# Patient Record
Sex: FEMALE | Race: WHITE | Employment: OTHER | URBAN - NONMETROPOLITAN AREA
[De-identification: names, ages, dates, MRNs, and addresses within clinical notes are randomized per-mention and may not be internally consistent; named-entity substitution may affect disease eponyms.]

---

## 2017-04-05 ENCOUNTER — HOSPITAL ENCOUNTER (OUTPATIENT)
Dept: GENERAL RADIOLOGY | Age: 77
Discharge: HOME | End: 2017-04-05
Payer: MEDICARE

## 2017-04-05 ENCOUNTER — HOSPITAL ENCOUNTER (OUTPATIENT)
Dept: CT IMAGING | Age: 77
Discharge: HOME | End: 2017-04-05
Payer: MEDICARE

## 2017-04-05 ENCOUNTER — HOSPITAL ENCOUNTER (OUTPATIENT)
Dept: GENERAL RADIOLOGY | Age: 77
End: 2017-04-05
Payer: MEDICARE

## 2017-04-05 VITALS
BODY MASS INDEX: 31.34 KG/M2 | WEIGHT: 166 LBS | DIASTOLIC BLOOD PRESSURE: 61 MMHG | SYSTOLIC BLOOD PRESSURE: 127 MMHG | OXYGEN SATURATION: 92 % | TEMPERATURE: 98.3 F | RESPIRATION RATE: 17 BRPM | HEIGHT: 61 IN | HEART RATE: 60 BPM

## 2017-04-05 DIAGNOSIS — M79.604 RIGHT LEG PAIN: ICD-10-CM

## 2017-04-05 DIAGNOSIS — Z98.890 STATUS POST MYELOGRAM: ICD-10-CM

## 2017-04-05 DIAGNOSIS — M79.605 LEFT LEG PAIN: ICD-10-CM

## 2017-04-05 LAB
APTT: 27.2 SEC (ref 26–36.2)
INR BLD: 0.94 (ref 0.88–1.18)
PLATELET # BLD: 235 K/UL (ref 130–400)
PROTHROMBIN TIME: 12.6 SEC (ref 12–14.6)

## 2017-04-05 PROCEDURE — 6360000004 HC RX CONTRAST MEDICATION: Performed by: NEUROLOGICAL SURGERY

## 2017-04-05 PROCEDURE — 85730 THROMBOPLASTIN TIME PARTIAL: CPT

## 2017-04-05 PROCEDURE — 2720000000 FL MYELOGRAM LUMBOSACRAL

## 2017-04-05 PROCEDURE — 72131 CT LUMBAR SPINE W/O DYE: CPT

## 2017-04-05 PROCEDURE — 85049 AUTOMATED PLATELET COUNT: CPT

## 2017-04-05 PROCEDURE — 62304 MYELOGRAPHY LUMBAR INJECTION: CPT

## 2017-04-05 PROCEDURE — 85610 PROTHROMBIN TIME: CPT

## 2017-04-05 RX ADMIN — IOPAMIDOL 20 ML: 408 INJECTION, SOLUTION INTRATHECAL at 09:49

## 2017-04-05 ASSESSMENT — PAIN - FUNCTIONAL ASSESSMENT: PAIN_FUNCTIONAL_ASSESSMENT: 0-10

## 2017-04-05 ASSESSMENT — PAIN SCALES - GENERAL: PAINLEVEL_OUTOF10: 0

## 2018-04-24 RX ORDER — HYDROCHLOROTHIAZIDE 25 MG/1
25 TABLET ORAL DAILY
COMMUNITY
End: 2018-04-25 | Stop reason: ALTCHOICE

## 2018-04-24 RX ORDER — ATENOLOL 25 MG/1
25 TABLET ORAL DAILY
COMMUNITY
End: 2018-04-25 | Stop reason: DRUGHIGH

## 2018-04-24 RX ORDER — OMEPRAZOLE 40 MG/1
40 CAPSULE, DELAYED RELEASE ORAL DAILY
COMMUNITY

## 2018-04-24 RX ORDER — PREDNISONE 20 MG/1
20 TABLET ORAL
COMMUNITY
End: 2018-04-25 | Stop reason: ALTCHOICE

## 2018-04-24 RX ORDER — LEVOTHYROXINE SODIUM 0.03 MG/1
25 TABLET ORAL DAILY
COMMUNITY
End: 2018-04-25 | Stop reason: DRUGHIGH

## 2018-04-24 RX ORDER — CLONAZEPAM 2 MG/1
2 TABLET ORAL NIGHTLY
COMMUNITY

## 2018-04-24 RX ORDER — ESTRADIOL 1 MG/1
1 TABLET ORAL DAILY
COMMUNITY
End: 2022-07-27

## 2018-04-24 RX ORDER — SIMVASTATIN 40 MG
40 TABLET ORAL EVERY MORNING
COMMUNITY

## 2018-04-24 RX ORDER — MEMANTINE HYDROCHLORIDE 10 MG/1
10 TABLET ORAL DAILY
COMMUNITY

## 2018-04-24 RX ORDER — ATENOLOL 50 MG/1
25 TABLET ORAL DAILY
COMMUNITY

## 2018-04-24 RX ORDER — METHOCARBAMOL 750 MG/1
750 TABLET, FILM COATED ORAL 2 TIMES DAILY
COMMUNITY
End: 2018-04-25 | Stop reason: ALTCHOICE

## 2018-04-24 RX ORDER — LEVOTHYROXINE SODIUM 0.07 MG/1
75 TABLET ORAL DAILY
COMMUNITY

## 2018-04-24 RX ORDER — DONEPEZIL HYDROCHLORIDE 10 MG/1
10 TABLET, FILM COATED ORAL DAILY
COMMUNITY

## 2018-04-24 RX ORDER — FUROSEMIDE 40 MG/1
40 TABLET ORAL DAILY
COMMUNITY

## 2018-04-24 RX ORDER — ROPINIROLE 5 MG/1
5 TABLET, FILM COATED ORAL NIGHTLY
COMMUNITY

## 2018-04-24 RX ORDER — FUROSEMIDE 20 MG/1
20 TABLET ORAL DAILY
COMMUNITY
End: 2018-04-25 | Stop reason: DRUGHIGH

## 2018-04-24 RX ORDER — LEVALBUTEROL INHALATION SOLUTION 1.25 MG/3ML
1 SOLUTION RESPIRATORY (INHALATION) EVERY 4 HOURS PRN
COMMUNITY
End: 2022-07-27

## 2018-04-24 RX ORDER — LOSARTAN POTASSIUM 100 MG/1
100 TABLET ORAL DAILY
COMMUNITY
End: 2018-04-25 | Stop reason: ALTCHOICE

## 2018-04-25 ENCOUNTER — OFFICE VISIT (OUTPATIENT)
Dept: CARDIOLOGY | Age: 78
End: 2018-04-25
Payer: MEDICARE

## 2018-04-25 VITALS
WEIGHT: 177 LBS | SYSTOLIC BLOOD PRESSURE: 136 MMHG | DIASTOLIC BLOOD PRESSURE: 74 MMHG | HEART RATE: 66 BPM | HEIGHT: 61 IN | BODY MASS INDEX: 33.42 KG/M2

## 2018-04-25 DIAGNOSIS — R60.1 GENERALIZED EDEMA: Primary | ICD-10-CM

## 2018-04-25 DIAGNOSIS — M79.89 SWELLING OF EXTREMITY: ICD-10-CM

## 2018-04-25 PROCEDURE — G8427 DOCREV CUR MEDS BY ELIG CLIN: HCPCS | Performed by: INTERNAL MEDICINE

## 2018-04-25 PROCEDURE — 4040F PNEUMOC VAC/ADMIN/RCVD: CPT | Performed by: INTERNAL MEDICINE

## 2018-04-25 PROCEDURE — 93000 ELECTROCARDIOGRAM COMPLETE: CPT | Performed by: INTERNAL MEDICINE

## 2018-04-25 PROCEDURE — G8400 PT W/DXA NO RESULTS DOC: HCPCS | Performed by: INTERNAL MEDICINE

## 2018-04-25 PROCEDURE — 1123F ACP DISCUSS/DSCN MKR DOCD: CPT | Performed by: INTERNAL MEDICINE

## 2018-04-25 PROCEDURE — 1090F PRES/ABSN URINE INCON ASSESS: CPT | Performed by: INTERNAL MEDICINE

## 2018-04-25 PROCEDURE — 99203 OFFICE O/P NEW LOW 30 MIN: CPT | Performed by: INTERNAL MEDICINE

## 2018-04-25 PROCEDURE — 1036F TOBACCO NON-USER: CPT | Performed by: INTERNAL MEDICINE

## 2018-04-25 PROCEDURE — G8417 CALC BMI ABV UP PARAM F/U: HCPCS | Performed by: INTERNAL MEDICINE

## 2018-04-25 RX ORDER — FUROSEMIDE 20 MG/1
80 TABLET ORAL DAILY
COMMUNITY
End: 2018-05-09 | Stop reason: DRUGHIGH

## 2018-05-02 ENCOUNTER — TELEPHONE (OUTPATIENT)
Dept: CARDIOLOGY | Age: 78
End: 2018-05-02

## 2018-05-09 ENCOUNTER — OFFICE VISIT (OUTPATIENT)
Dept: CARDIOLOGY | Age: 78
End: 2018-05-09
Payer: MEDICARE

## 2018-05-09 ENCOUNTER — HOSPITAL ENCOUNTER (OUTPATIENT)
Dept: NON INVASIVE DIAGNOSTICS | Age: 78
Discharge: HOME | End: 2018-05-09
Payer: MEDICARE

## 2018-05-09 VITALS
HEART RATE: 76 BPM | SYSTOLIC BLOOD PRESSURE: 120 MMHG | HEIGHT: 61 IN | BODY MASS INDEX: 32.66 KG/M2 | WEIGHT: 173 LBS | DIASTOLIC BLOOD PRESSURE: 70 MMHG

## 2018-05-09 DIAGNOSIS — R60.1 GENERALIZED EDEMA: ICD-10-CM

## 2018-05-09 DIAGNOSIS — E87.6 HYPOKALEMIA: ICD-10-CM

## 2018-05-09 DIAGNOSIS — M79.89 SWELLING OF EXTREMITY: Primary | ICD-10-CM

## 2018-05-09 LAB
ANION GAP SERPL CALCULATED.3IONS-SCNC: 16 MMOL/L (ref 7–19)
BUN BLDV-MCNC: 17 MG/DL (ref 8–23)
CALCIUM SERPL-MCNC: 9.7 MG/DL (ref 8.8–10.2)
CHLORIDE BLD-SCNC: 99 MMOL/L (ref 98–111)
CO2: 31 MMOL/L (ref 22–29)
CREAT SERPL-MCNC: 0.6 MG/DL (ref 0.5–0.9)
GFR NON-AFRICAN AMERICAN: >60
GLUCOSE BLD-MCNC: 98 MG/DL (ref 74–109)
LV EF: 58 %
LVEF MODALITY: NORMAL
POTASSIUM SERPL-SCNC: 4.2 MMOL/L (ref 3.5–5)
SODIUM BLD-SCNC: 146 MMOL/L (ref 136–145)

## 2018-05-09 PROCEDURE — 93000 ELECTROCARDIOGRAM COMPLETE: CPT | Performed by: NURSE PRACTITIONER

## 2018-05-09 PROCEDURE — 1036F TOBACCO NON-USER: CPT | Performed by: NURSE PRACTITIONER

## 2018-05-09 PROCEDURE — 1123F ACP DISCUSS/DSCN MKR DOCD: CPT | Performed by: NURSE PRACTITIONER

## 2018-05-09 PROCEDURE — 1090F PRES/ABSN URINE INCON ASSESS: CPT | Performed by: NURSE PRACTITIONER

## 2018-05-09 PROCEDURE — 4040F PNEUMOC VAC/ADMIN/RCVD: CPT | Performed by: NURSE PRACTITIONER

## 2018-05-09 PROCEDURE — 93306 TTE W/DOPPLER COMPLETE: CPT

## 2018-05-09 PROCEDURE — G8427 DOCREV CUR MEDS BY ELIG CLIN: HCPCS | Performed by: NURSE PRACTITIONER

## 2018-05-09 PROCEDURE — 99213 OFFICE O/P EST LOW 20 MIN: CPT | Performed by: NURSE PRACTITIONER

## 2018-05-09 PROCEDURE — G8417 CALC BMI ABV UP PARAM F/U: HCPCS | Performed by: NURSE PRACTITIONER

## 2018-05-09 PROCEDURE — G8400 PT W/DXA NO RESULTS DOC: HCPCS | Performed by: NURSE PRACTITIONER

## 2018-06-06 ENCOUNTER — OFFICE VISIT (OUTPATIENT)
Dept: CARDIOLOGY | Age: 78
End: 2018-06-06
Payer: MEDICARE

## 2018-06-06 VITALS
HEART RATE: 64 BPM | HEIGHT: 61 IN | DIASTOLIC BLOOD PRESSURE: 94 MMHG | BODY MASS INDEX: 33.99 KG/M2 | SYSTOLIC BLOOD PRESSURE: 154 MMHG | WEIGHT: 180 LBS

## 2018-06-06 DIAGNOSIS — I10 ESSENTIAL HYPERTENSION: Primary | ICD-10-CM

## 2018-06-06 DIAGNOSIS — M79.89 SWELLING OF EXTREMITY: ICD-10-CM

## 2018-06-06 LAB
ANION GAP SERPL CALCULATED.3IONS-SCNC: 14 MMOL/L (ref 7–19)
BUN BLDV-MCNC: 12 MG/DL (ref 8–23)
CALCIUM SERPL-MCNC: 8.7 MG/DL (ref 8.8–10.2)
CHLORIDE BLD-SCNC: 101 MMOL/L (ref 98–111)
CO2: 26 MMOL/L (ref 22–29)
CREAT SERPL-MCNC: 0.5 MG/DL (ref 0.5–0.9)
GFR NON-AFRICAN AMERICAN: >60
GLUCOSE BLD-MCNC: 87 MG/DL (ref 74–109)
POTASSIUM SERPL-SCNC: 3.3 MMOL/L (ref 3.5–5)
SODIUM BLD-SCNC: 141 MMOL/L (ref 136–145)

## 2018-06-06 PROCEDURE — 1090F PRES/ABSN URINE INCON ASSESS: CPT | Performed by: INTERNAL MEDICINE

## 2018-06-06 PROCEDURE — G8400 PT W/DXA NO RESULTS DOC: HCPCS | Performed by: INTERNAL MEDICINE

## 2018-06-06 PROCEDURE — G8427 DOCREV CUR MEDS BY ELIG CLIN: HCPCS | Performed by: INTERNAL MEDICINE

## 2018-06-06 PROCEDURE — G8417 CALC BMI ABV UP PARAM F/U: HCPCS | Performed by: INTERNAL MEDICINE

## 2018-06-06 PROCEDURE — 93000 ELECTROCARDIOGRAM COMPLETE: CPT | Performed by: INTERNAL MEDICINE

## 2018-06-06 PROCEDURE — 1123F ACP DISCUSS/DSCN MKR DOCD: CPT | Performed by: INTERNAL MEDICINE

## 2018-06-06 PROCEDURE — 99213 OFFICE O/P EST LOW 20 MIN: CPT | Performed by: INTERNAL MEDICINE

## 2018-06-06 PROCEDURE — 1036F TOBACCO NON-USER: CPT | Performed by: INTERNAL MEDICINE

## 2018-06-06 PROCEDURE — 4040F PNEUMOC VAC/ADMIN/RCVD: CPT | Performed by: INTERNAL MEDICINE

## 2018-06-06 RX ORDER — CHOLECALCIFEROL (VITAMIN D3) 25 MCG
TABLET,CHEWABLE ORAL
COMMUNITY

## 2018-06-06 RX ORDER — MULTIVITAMIN WITH IRON
250 TABLET ORAL DAILY
COMMUNITY
End: 2022-07-27

## 2018-06-06 RX ORDER — LISINOPRIL 10 MG/1
10 TABLET ORAL DAILY
Qty: 30 TABLET | Refills: 5 | Status: SHIPPED | OUTPATIENT
Start: 2018-06-06 | End: 2018-06-14 | Stop reason: SDUPTHER

## 2018-06-06 ASSESSMENT — ENCOUNTER SYMPTOMS
SHORTNESS OF BREATH: 0
SHORTNESS OF BREATH: 0

## 2018-06-08 DIAGNOSIS — Z86.39 HISTORY OF LOW POTASSIUM: Primary | ICD-10-CM

## 2018-06-08 RX ORDER — POTASSIUM CHLORIDE 1.5 G/1.77G
20 POWDER, FOR SOLUTION ORAL 2 TIMES DAILY
Qty: 30 EACH | Refills: 3 | Status: SHIPPED | OUTPATIENT
Start: 2018-06-08 | End: 2018-07-10 | Stop reason: SDUPTHER

## 2018-06-11 ENCOUNTER — TELEPHONE (OUTPATIENT)
Dept: CARDIOLOGY | Age: 78
End: 2018-06-11

## 2018-06-11 DIAGNOSIS — Z86.39 HISTORY OF LOW POTASSIUM: Primary | ICD-10-CM

## 2018-06-12 RX ORDER — POTASSIUM CHLORIDE 20 MEQ/1
20 TABLET, EXTENDED RELEASE ORAL DAILY
Qty: 60 TABLET | Refills: 3 | Status: SHIPPED | OUTPATIENT
Start: 2018-06-12 | End: 2019-02-13 | Stop reason: SDUPTHER

## 2018-06-14 ENCOUNTER — TELEPHONE (OUTPATIENT)
Dept: CARDIOLOGY | Age: 78
End: 2018-06-14

## 2018-06-14 DIAGNOSIS — I10 ESSENTIAL HYPERTENSION: ICD-10-CM

## 2018-06-14 RX ORDER — LISINOPRIL 20 MG/1
20 TABLET ORAL DAILY
Qty: 30 TABLET | Refills: 5 | Status: SHIPPED | OUTPATIENT
Start: 2018-06-14 | End: 2018-06-15 | Stop reason: SDUPTHER

## 2018-06-15 ENCOUNTER — TELEPHONE (OUTPATIENT)
Dept: CARDIOLOGY | Age: 78
End: 2018-06-15

## 2018-06-15 DIAGNOSIS — I10 ESSENTIAL HYPERTENSION: ICD-10-CM

## 2018-06-15 RX ORDER — LISINOPRIL 20 MG/1
20 TABLET ORAL DAILY
Qty: 30 TABLET | Refills: 5 | Status: SHIPPED | OUTPATIENT
Start: 2018-06-15 | End: 2018-07-10 | Stop reason: SINTOL

## 2018-06-15 RX ORDER — NIFEDIPINE 30 MG/1
30 TABLET, FILM COATED, EXTENDED RELEASE ORAL DAILY
Qty: 30 TABLET | Refills: 5 | Status: SHIPPED | OUTPATIENT
Start: 2018-06-15 | End: 2018-07-10 | Stop reason: DRUGHIGH

## 2018-07-10 ENCOUNTER — OFFICE VISIT (OUTPATIENT)
Dept: CARDIOLOGY | Age: 78
End: 2018-07-10
Payer: MEDICARE

## 2018-07-10 VITALS
SYSTOLIC BLOOD PRESSURE: 122 MMHG | DIASTOLIC BLOOD PRESSURE: 70 MMHG | WEIGHT: 177.6 LBS | HEART RATE: 70 BPM | BODY MASS INDEX: 33.53 KG/M2 | HEIGHT: 61 IN

## 2018-07-10 DIAGNOSIS — I10 ESSENTIAL HYPERTENSION: ICD-10-CM

## 2018-07-10 PROCEDURE — 4040F PNEUMOC VAC/ADMIN/RCVD: CPT | Performed by: NURSE PRACTITIONER

## 2018-07-10 PROCEDURE — 1090F PRES/ABSN URINE INCON ASSESS: CPT | Performed by: NURSE PRACTITIONER

## 2018-07-10 PROCEDURE — 1123F ACP DISCUSS/DSCN MKR DOCD: CPT | Performed by: NURSE PRACTITIONER

## 2018-07-10 PROCEDURE — 1036F TOBACCO NON-USER: CPT | Performed by: NURSE PRACTITIONER

## 2018-07-10 PROCEDURE — G8427 DOCREV CUR MEDS BY ELIG CLIN: HCPCS | Performed by: NURSE PRACTITIONER

## 2018-07-10 PROCEDURE — 99213 OFFICE O/P EST LOW 20 MIN: CPT | Performed by: NURSE PRACTITIONER

## 2018-07-10 PROCEDURE — G8417 CALC BMI ABV UP PARAM F/U: HCPCS | Performed by: NURSE PRACTITIONER

## 2018-07-10 PROCEDURE — G8400 PT W/DXA NO RESULTS DOC: HCPCS | Performed by: NURSE PRACTITIONER

## 2018-07-10 RX ORDER — NIFEDIPINE 30 MG/1
30 TABLET, FILM COATED, EXTENDED RELEASE ORAL 2 TIMES DAILY
Qty: 60 TABLET | Refills: 5 | Status: SHIPPED | OUTPATIENT
Start: 2018-07-10 | End: 2018-07-11 | Stop reason: CLARIF

## 2018-07-10 NOTE — PROGRESS NOTES
Disp: , Rfl:     levothyroxine (SYNTHROID) 75 MCG tablet, Take 75 mcg by mouth Daily, Disp: , Rfl:     memantine (NAMENDA) 10 MG tablet, Take 10 mg by mouth daily, Disp: , Rfl:     omeprazole (PRILOSEC) 40 MG delayed release capsule, Take 40 mg by mouth daily, Disp: , Rfl:     rOPINIRole (REQUIP) 5 MG tablet, Take 5 mg by mouth nightly, Disp: , Rfl:     sertraline (ZOLOFT) 50 MG tablet, Take 50 mg by mouth daily, Disp: , Rfl:     simvastatin (ZOCOR) 40 MG tablet, Take 40 mg by mouth every morning, Disp: , Rfl:     levalbuterol (XOPENEX) 1.25 MG/3ML nebulizer solution, Take 1 ampule by nebulization every 4 hours as needed for Wheezing, Disp: , Rfl:     aspirin 81 MG tablet, Take 81 mg by mouth daily, Disp: , Rfl:     PE:  Vitals:    07/10/18 1122   BP: 122/70   Pulse: 70       Estimated body mass index is 33.56 kg/m² as calculated from the following:    Height as of this encounter: 5' 1\" (1.549 m). Weight as of this encounter: 177 lb 9.6 oz (80.6 kg). Constitutional: She is oriented to person, place, and time. She appears well-developed and well-nourished in no acute distress. Head: Normocephalic and atraumatic. Neck:  Neck supple without JVD present. Cardiovascular: Normal rate, regular rhythm, normal heart sounds. no murmur ascultated. No gallop and no friction rub.  no carotid bruits. no peripheral edema. Pulmonary/Chest:  Lungs clear to auscultation bilaterally without evidence of respiratory distress. She without wheezes. She without rales or ronchi. Musculoskeletal: Normal range of motion. Gait is normal no assitive device. Neurological: She is alert and oriented to person, place, and time. Skin: Skin is warm and dry without rash or pallor. Psychiatric: She has a normal mood and affect. Her behavior is normal. Thought content normal.     Lab Results   Component Value Date    CREATININE 0.5 06/06/2018    CREATININE 0.6 05/09/2018       Assessment     Diagnosis Orders   1.

## 2018-07-10 NOTE — PATIENT INSTRUCTIONS
Stop taking the Lisinopril 20 mg due to causing a cough  Begin taking the Nifedipine 30 mg twice a day. Keep a blood pressure log and bring to next appointment  Best time to take your readings is 2 hours after taking medications.

## 2018-07-11 ENCOUNTER — TELEPHONE (OUTPATIENT)
Dept: CARDIOLOGY | Age: 78
End: 2018-07-11

## 2018-07-11 RX ORDER — NIFEDIPINE 60 MG/1
60 TABLET, FILM COATED, EXTENDED RELEASE ORAL DAILY
Qty: 30 TABLET | Refills: 3 | Status: SHIPPED | OUTPATIENT
Start: 2018-07-11 | End: 2018-08-09 | Stop reason: SDUPTHER

## 2018-08-08 ENCOUNTER — TELEPHONE (OUTPATIENT)
Dept: CARDIOLOGY | Age: 78
End: 2018-08-08

## 2018-08-09 RX ORDER — NIFEDIPINE 60 MG/1
60 TABLET, FILM COATED, EXTENDED RELEASE ORAL DAILY
Qty: 90 TABLET | Refills: 3 | Status: SHIPPED | OUTPATIENT
Start: 2018-08-09 | End: 2019-05-15 | Stop reason: SDUPTHER

## 2019-02-13 DIAGNOSIS — Z86.39 HISTORY OF LOW POTASSIUM: ICD-10-CM

## 2019-02-13 RX ORDER — POTASSIUM CHLORIDE 20 MEQ/1
20 TABLET, EXTENDED RELEASE ORAL DAILY
Qty: 60 TABLET | Refills: 5 | Status: SHIPPED | OUTPATIENT
Start: 2019-02-13 | End: 2022-07-27

## 2019-05-16 RX ORDER — NIFEDIPINE 60 MG/1
TABLET, FILM COATED, EXTENDED RELEASE ORAL
Qty: 90 TABLET | Refills: 3 | Status: SHIPPED | OUTPATIENT
Start: 2019-05-16 | End: 2020-05-07

## 2020-05-07 RX ORDER — NIFEDIPINE 60 MG/1
TABLET, FILM COATED, EXTENDED RELEASE ORAL
Qty: 90 TABLET | Refills: 3 | Status: SHIPPED | OUTPATIENT
Start: 2020-05-07 | End: 2022-07-27

## 2021-02-05 RX ORDER — NIFEDIPINE 60 MG/1
TABLET, FILM COATED, EXTENDED RELEASE ORAL
Qty: 90 TABLET | Refills: 3 | OUTPATIENT
Start: 2021-02-05

## 2022-07-26 ENCOUNTER — TELEPHONE (OUTPATIENT)
Dept: CARDIOLOGY CLINIC | Age: 82
End: 2022-07-26

## 2022-07-27 ENCOUNTER — OFFICE VISIT (OUTPATIENT)
Dept: CARDIOLOGY CLINIC | Age: 82
End: 2022-07-27
Payer: COMMERCIAL

## 2022-07-27 VITALS
SYSTOLIC BLOOD PRESSURE: 162 MMHG | HEIGHT: 61 IN | DIASTOLIC BLOOD PRESSURE: 86 MMHG | HEART RATE: 65 BPM | OXYGEN SATURATION: 95 % | WEIGHT: 171 LBS | BODY MASS INDEX: 32.28 KG/M2

## 2022-07-27 DIAGNOSIS — R42 EPISODE OF DIZZINESS: ICD-10-CM

## 2022-07-27 DIAGNOSIS — R55 SYNCOPE, UNSPECIFIED SYNCOPE TYPE: ICD-10-CM

## 2022-07-27 DIAGNOSIS — E78.2 MIXED HYPERLIPIDEMIA: ICD-10-CM

## 2022-07-27 DIAGNOSIS — I44.7 LEFT BUNDLE BRANCH BLOCK (LBBB): ICD-10-CM

## 2022-07-27 DIAGNOSIS — R06.02 SOB (SHORTNESS OF BREATH): ICD-10-CM

## 2022-07-27 DIAGNOSIS — I10 HYPERTENSION, UNSPECIFIED TYPE: Primary | ICD-10-CM

## 2022-07-27 PROCEDURE — 1123F ACP DISCUSS/DSCN MKR DOCD: CPT | Performed by: NURSE PRACTITIONER

## 2022-07-27 PROCEDURE — 93000 ELECTROCARDIOGRAM COMPLETE: CPT | Performed by: NURSE PRACTITIONER

## 2022-07-27 PROCEDURE — 93242 EXT ECG>48HR<7D RECORDING: CPT | Performed by: NURSE PRACTITIONER

## 2022-07-27 PROCEDURE — 99214 OFFICE O/P EST MOD 30 MIN: CPT | Performed by: NURSE PRACTITIONER

## 2022-07-27 RX ORDER — LOSARTAN POTASSIUM 50 MG/1
50 TABLET ORAL DAILY
Qty: 90 TABLET | Refills: 1 | Status: SHIPPED | OUTPATIENT
Start: 2022-07-27 | End: 2022-08-02 | Stop reason: ALTCHOICE

## 2022-07-27 RX ORDER — ZINC GLUCONATE 50 MG
50 TABLET ORAL DAILY
COMMUNITY

## 2022-07-27 RX ORDER — MONTELUKAST SODIUM 4 MG/1
1.5 TABLET, CHEWABLE ORAL DAILY
COMMUNITY

## 2022-07-27 NOTE — PATIENT INSTRUCTIONS
New instructions for today:  Add losartan 50 mg (1) tab daily to lower blood pressure. Monitor your blood pressure twice daily and keep a log. Your blood pressure goal is 130/80 or less. We will discuss in 2 weeks by either scheduled telephone encounter or patient call back. Office phone number [phone]. The adhesive cardiac monitor will need to stay on for one week. Use the symptom marker as instructed. Mail back the monitor in the postage paid box provided. Patient Instructions:  Continue current medications as prescribed. Always keep a current medication list. Bring your medications to every office visit. Continue to follow up with primary care provider for non cardiac medical problems. Call the office with any problems, questions or concerns at [phone]. If you have been asked to keep a blood pressure log, do so for 2 weeks. Call the office to report readings to the triage nurse at [phone]. Follow up with cardiologist as scheduled. The following educational material has been included in this after visit summary for your review: Life simple 7. Heart health. Life simple 7  1) Manage blood pressure - high blood pressure is a major risk factor for heart disease and stroke. Keeping blood pressure in health range reduces strain on your heart, arteries and kidneys. Blood pressure goal is less than 130/80. 2) Control cholesterol - contributes to plaque, which can clog arteries and lead to heart disease and stroke. When you control your cholesterol you are giving your arteries their best chance to remain clear. It is recommended that you get cholesterol lab work done once a year. 3) Reduce blood sugar - most of the food we eat is turning into glucose or blood sugar that our body uses for energy. Over time, high levels of blood sugar can damage your heart, kidneys, eyes and nerves.   4) Get active - living an active life is one of the most rewarding gifts you can give yourself and those you love. Simply put, daily physical activity increases your length and quality of life. Strive to exercise 15 minutes most days of the week. 5)  Eat better - A healthy diet is one of your best weapons for fighting cardiovascular disease. When you eat a heart healthy diet, you improve your chances for feeling good and staying healthy for life. 6)  Lose weight - when you shed extra fat an unnecessary pounds, you reduce the burden on your hear, lungs, blood vessels and skeleton. You give yourself the gift of active living, you lower your blood pressure and help yourself feel better. 7) Stop smoking - cigarette smokers have a higher risk of developing cardiovascular disease. If  You smoke, quitting is the best thing you can do for your health. Check American Heart Association on line for more information on Life's Simple 7 and tips for healthy living. A Healthy Heart: Care Instructions  Your Care Instructions     Coronary artery disease, also called heart disease, occurs when a substance called plaque builds up in the vessels that supply oxygen-rich blood to your heart muscle. This can narrow the blood vessels and reduce blood flow. A heart attack happens when blood flow is completely blocked. A high-fat diet, smoking, and other factors increase the risk of heart disease. Your doctor has found that you have a chance of having heart disease. You can do lots of things to keep your heart healthy. It may not be easy, but you can change your diet, exercise more, and quit smoking. These steps really work to lower your chance of heart disease. Follow-up care is a key part of your treatment and safety. Be sure to make and go to all appointments, and call your doctor if you are having problems. It's also a good idea to know your test results and keep a list of the medicines you take. How can you care for yourself at home? Diet  Use less salt when you cook and eat. This helps lower your blood pressure. Taste food before salting. Add only a little salt when you think you need it. With time, your taste buds will adjust to less salt. Eat fewer snack items, fast foods, canned soups, and other high-salt, high-fat, processed foods. Read food labels and try to avoid saturated and trans fats. They increase your risk of heart disease by raising cholesterol levels. Limit the amount of solid fat-butter, margarine, and shortening-you eat. Use olive, peanut, or canola oil when you cook. Bake, broil, and steam foods instead of frying them. Eat a variety of fruit and vegetables every day. Dark green, deep orange, red, or yellow fruits and vegetables are especially good for you. Examples include spinach, carrots, peaches, and berries. Foods high in fiber can reduce your cholesterol and provide important vitamins and minerals. High-fiber foods include whole-grain cereals and breads, oatmeal, beans, brown rice, citrus fruits, and apples. Eat lean proteins. Heart-healthy proteins include seafood, lean meats and poultry, eggs, beans, peas, nuts, seeds, and soy products. Limit drinks and foods with added sugar. These include candy, desserts, and soda pop. Lifestyle changes  If your doctor recommends it, get more exercise. Walking is a good choice. Bit by bit, increase the amount you walk every day. Try for at least 30 minutes on most days of the week. You also may want to swim, bike, or do other activities. Do not smoke. If you need help quitting, talk to your doctor about stop-smoking programs and medicines. These can increase your chances of quitting for good. Quitting smoking may be the most important step you can take to protect your heart. It is never too late to quit. Limit alcohol to 2 drinks a day for men and 1 drink a day for women. Too much alcohol can cause health problems. Manage other health problems such as diabetes, high blood pressure, and high cholesterol.  If you think you may have a problem with alcohol or drug use, talk to your doctor. Medicines  Take your medicines exactly as prescribed. Call your doctor if you think you are having a problem with your medicine. If your doctor recommends aspirin, take the amount directed each day. Make sure you take aspirin and not another kind of pain reliever, such as acetaminophen (Tylenol). When should you call for help? [id number] if you have symptoms of a heart attack. These may include:  Chest pain or pressure, or a strange feeling in the chest.  Sweating. Shortness of breath. Pain, pressure, or a strange feeling in the back, neck, jaw, or upper belly or in one or both shoulders or arms. Lightheadedness or sudden weakness. A fast or irregular heartbeat. After you call 911, the  may tell you to chew 1 adult-strength or 2 to 4 low-dose aspirin. Wait for an ambulance. Do not try to drive yourself. Watch closely for changes in your health, and be sure to contact your doctor if you have any problems. Where can you learn more? Go to https://[employer]. org and sign in to your [employer] account. Enter M721 in the [employer] box to learn more about \"A Healthy Heart: Care Instructions. \"     If you do not have an account, please click on the \"Sign Up Now\" link. Current as of: December 16, 2019               Content Version: 12.5  © 1745-9449 Healthwise, Incorporated. Care instructions adapted under license by [facility] ([facility]). If you have questions about a medical condition or this instruction, always ask your healthcare professional. [place] [address] any warranty or liability for your use of this information. [place] Nuclear Stress Test     Date/Time:    [place] at the [facility] and [address] [facility] located on the first floor of [address] [place] [address] through hospital main entrance and turn immediately to your left.     Test Description:  There are two parts to a Lexiscan stress test: the Enter through hospital main entrance and turn immediately to your left. Date/Time:     Patient's contact number:  [phone] (home)     Echocardiogram -  No prep. Takes approximately 30 min. An echocardiogram uses sound waves to produce images of your heart. This commonly used test allows your doctor to see how your heart is beating and pumping blood. Your doctor can use the images from an echocardiogram to identify various abnormalities in the heart muscle and valves. This test has 2 parts: You will be asked to disrobe from the waist up and given a gown to wear. The technologist will then hook up an EKG monitor to you for the entire exam.   You will then have an ultrasound of your heart (echocardiogram) to assess the heart muscle, heart valves and heart function. You may eat and take any medicines before the exam.     If you need to change your appointment, please call outpatient scheduling at [phone].

## 2022-07-27 NOTE — PROGRESS NOTES
[facility], [place]. [address], [address] [facility]  [phone] office  [phone] fax      OFFICE VISIT:  7/27/2022    [place]: 1940  Reason For Visit:  Segun Johnson is a [de-identified] y.o. female who is here for New Patient (Referred by Leyda Clement for fluctuating BP, some dizziness, some SOA. Falling alot)    History:   Diagnosis Orders   1. Hypertension, unspecified type  NM MYOCARDIAL SPECT REST EXERCISE OR RX      2. Mixed hyperlipidemia        3. SOB (shortness of breath)  ECHO Complete 2D W Doppler W Color    NM MYOCARDIAL SPECT REST EXERCISE OR RX      4. Syncope, unspecified syncope type  ECHO Complete 2D W Doppler W Color    LONGTERM CONTINUOUS CARDIAC EVENT MONITOR (ZIO)      5. Episode of dizziness  ECHO Complete 2D W Doppler W Color    LONGTERM CONTINUOUS CARDIAC EVENT MONITOR (ZIO)      6. Left bundle branch block (LBBB)  LONGTERM CONTINUOUS CARDIAC EVENT MONITOR (ZIO)        The patient presents today as a new patient referral. She has a history of hypertension which has been uncontrolled recently. The patient has been having some balance issues. As a result, she has fallen 3 times over the past week. She has started taking Sinemet for possible Parkinson's disease. The patient reports occasional passing out. Her daughter reports Betsey Rankin has done this for years. \"  No report of recent syncope. The patient has no prior known CAD. She reports no chest pain but has experienced TRAORE for several years. This may be worsening somewhat. She reports no orthopnea, PND or edema. The patient stopped smoking about 30 years ago. She reports no sensed arrhythmia. The patient has a known first degree AVB and LBBB on EKG. The patient's PCP monitors cholesterol. Family hx -  Mother - CVA; father CABG twice. The patient has known LULÚ but will not use the CPAP.     Subjective  Segun Johnson denies exertional chest pain, shortness of breath, orthopnea, paroxysmal nocturnal dyspnea, sensed arrhythmia, edema and fatigue. The patient denies numbness or weakness to suggest cerebrovascular accident or transient ischemic attack.  + TRAORE. + hx of syncope over the years. + occasional dizzy spells. + balance issues. Budd Nose has the following history as recorded in [facility]:  Patient Active Problem List   Diagnosis Code    Swelling of extremity M79.89     Past Medical History:   Diagnosis Date    Arthritis     Asthma     Breast adenoma     COPD (chronic obstructive pulmonary disease) ([facility] [address]     Edema     Hyperlipidemia     Hypertension     Hypothyroidism      Past Surgical History:   Procedure Laterality Date    BREAST BIOPSY      CHOLECYSTECTOMY      COLONOSCOPY      HYSTERECTOMY (CERVIX STATUS UNKNOWN)      STAPEDES SURGERY       Family History   Problem Relation Age of Onset    Stroke Mother     Heart Disease Father     Heart Disease Other     Substance Abuse Other     Colon Cancer Other      Social History     Tobacco Use    Smoking status: Former    Smokeless tobacco: Former     Quit date: 4/5/1995   Substance Use Topics    Alcohol use: No      Current Outpatient Medications   Medication Sig Dispense Refill    carbidopa-levodopa (SINEMET)  MG per tablet carbidopa 25 mg-levodopa 100 mg tablet      zinc gluconate 50 MG tablet Take 50 mg by mouth in the morning. colestipol (COLESTID) 1 g tablet Take 1.5 g by mouth in the morning. Cyanocobalamin (B-12) 2500 MCG TABS Take by mouth      donepezil (ARICEPT) 10 MG tablet Take 10 mg by mouth daily      atenolol (TENORMIN) 50 MG tablet Take 25 mg by mouth in the morning. clonazePAM (KLONOPIN) 2 MG tablet Take 2 mg by mouth nightly. .      furosemide (LASIX) 40 MG tablet Take 40 mg by mouth daily      levothyroxine (SYNTHROID) 75 MCG tablet Take 75 mcg by mouth Daily      memantine (NAMENDA) 10 MG tablet Take 10 mg by mouth daily      omeprazole (PRILOSEC) 40 MG delayed release capsule Take 40 mg by mouth daily      rOPINIRole (REQUIP) 5 MG tablet Take 5 mg by mouth nightly      sertraline (ZOLOFT) 50 MG tablet Take 50 mg by mouth daily      simvastatin (ZOCOR) 40 MG tablet Take 40 mg by mouth every morning      aspirin 81 MG tablet Take 81 mg by mouth daily       No current facility-administered medications for this visit. Allergies: Patient has no known allergies. Review of Systems  Constitutional - no appetite change, or unexpected weight change. No fever, chills or diaphoresis. No significant change in activity level or new onset of fatigue. HEENT - no significant rhinorrhea or epistaxis. No tinnitus or significant hearing loss. Eyes - no sudden vision change or amaurosis. No corneal arcus, xantholasma, subconjunctival hemorrhage or discharge. Respiratory - no significant wheezing, stridor, apnea or cough.  + TRAORE. Cardiovascular - no exertional chest pain to suggest myocardial ischemia. No orthopnea or PND. No sensation of sustained arrythmia. No occurrence of slow heart rate. No palpitations. No claudication. Gastrointestinal - no abdominal swelling or pain. No blood in stool. No severe constipation, diarrhea, nausea, or vomiting. Genitourinary - no dysuria, frequency, or urgency. No flank pain or hematuria. Musculoskeletal - no back pain or myalgia. No problems with gait. Extremities - no clubbing, cyanosis or extremity edema. Skin - no color change or rash. No pallor. No new surgical incision. Neurologic - no speech difficulty, facial asymmetry or lateralizing weakness. No seizures. + occasional dizziness. + balance issues. + hx of occasional syncope for years. Hematologic - no easy bruising or excessive bleeding. Psychiatric - no severe anxiety or insomnia. No confusion. All other review of systems are negative.     Objective  Vital Signs - BP (!) 162/86   Pulse 65   Ht 5' 1\" (1.549 m)   Wt 171 lb (77.6 kg)   SpO2 95%   BMI 32.31 kg/m² Diego Trotter is alert, cooperative, and pleasant. Well groomed. No acute distress. Body habitus - Body mass index is 32.31 kg/m². HEENT - Head is normocephalic. No circumoral cyanosis. Dentition is normal.  EYES -   Lids normal without ptosis. No discharge, edema or subconjunctival hemorrhage. Neck - Symmetrical without apparent mass or lymphadenopathy. Respiratory - Normal respiratory effort without use of accessory muscles. Ausculatation reveals vesicular breath sounds without crackles, wheezes, rub or rhonchi. Cardiovascular - No jugular venous distention. Auscultation reveals regular rate and rhythm. No audible clicks, gallop or rub. No murmur. No lower extremity varicosities. No carotid bruits. Abdominal -  No visible distention, mass or pulsations. Extremities - No clubbing or cyanosis. No statis dermatitis or ulcers. No edema. Musculoskeletal -   No Osler's nodes. No kyphosis or scoliosis. Gait is even and regular without limp or shuffle. Ambulates without assistance. Skin -  Warm and dry; no rash or pallor. No new surgical wound. Neurological - No focal neurological deficits. Thought processes coherent. No apparent tremor. Oriented to person, place and time. Psychiatric -  Appropriate affect and mood. Data reviewed:  5/9/18 echo  Technically difficult examination. Mild tricuspid regurgitation with estimated RVSP of 33 mm Hg. Mild concentric left ventricular hypertrophy. Normal left ventricular size with preserved LV function and an estimated  ejection fraction of approximately 55-60%. E/A flow reversal noted. Suggestive of diastolic dysfunction.    Electronically signed by David Sebastian MD(Interpreting   physician) on 05/10/2018 02:49 PM    Lab Results   Component Value Date     04/05/2017     Lab Results   Component Value Date     06/06/2018    K 3.3 (L) 06/06/2018     06/06/2018    CO2 26 06/06/2018    BUN 12 06/06/2018 CREATININE 0.5 06/06/2018    GLUCOSE 87 06/06/2018    CALCIUM 8.7 (L) 06/06/2018    LABGLOM >60 06/06/2018     EKG today reviewed: NSR 63 BPM; 1st degree AVB - RANDOLPH .228; LBBB; no ecopty. BP Readings from Last 3 Encounters:   07/27/22 (!) 162/86   07/10/18 122/70   06/06/18 (!) 154/94    Pulse Readings from Last 3 Encounters:   07/27/22 65   07/10/18 70   06/06/18 64        Wt Readings from Last 3 Encounters:   07/27/22 171 lb (77.6 kg)   07/10/18 177 lb 9.6 oz (80.6 kg)   06/06/18 180 lb (81.6 kg)     Assessment/Plan:   Diagnosis Orders   1. Hypertension, unspecified type  NM MYOCARDIAL SPECT REST EXERCISE OR RX      2. Mixed hyperlipidemia        3. SOB (shortness of breath)  ECHO Complete 2D W Doppler W Color    NM MYOCARDIAL SPECT REST EXERCISE OR RX      4. Syncope, unspecified syncope type  ECHO Complete 2D W Doppler W Color    LONGTERM CONTINUOUS CARDIAC EVENT MONITOR (ZIO)      5. Episode of dizziness  ECHO Complete 2D W Doppler W Color    LONGTERM CONTINUOUS CARDIAC EVENT MONITOR (ZIO)      6. Left bundle branch block (LBBB)  LONGTERM CONTINUOUS CARDIAC EVENT MONITOR (ZIO)        HTN - uncontrolled HTN with recent addition of atenolol. Add losartan 50 mg daily. Home BP log. Goal less than 130/80. TRAORE with CAD risk factors to include family hx, HTN, hyperlipidemia and age. LBBB on EKG. Schedule 2D echo to assess cardiac structure. Schedule Lexiscan rx to rule out myocardial ischemia. Episode of dizziness with hx of syncope over the years - one week Zio cardiac monitor placed today. Disposition pending. Hyperlipidemia - monitored and managed by PCP. Continues on Zocor 40 mg daily. Patient is compliant with medication regimen. Previous cardiac history and records reviewed. Continue current medical management for cardiac related condition. Continue other current medications as directed. Continue to follow up with primary care provider for non cardiac medical problems.   If your primary care provider is outside of the [facility], please request that your labs be faxed to this office at [phone]. BP goal 130/80 or less. Call the office with any problems, questions or concerns at [phone]. Cardiology follow up as scheduled in [address] appointments. Educational included in patient instructions. Heart health.      Farshad Rios APRN

## 2022-08-02 ENCOUNTER — TELEPHONE (OUTPATIENT)
Dept: CARDIOTHORACIC SURGERY | Age: 82
End: 2022-08-02

## 2022-08-02 NOTE — TELEPHONE ENCOUNTER
Pt called stating she is having red bumps that itch all over her back. She states it started on Sunday 3 days after starting losartan.

## 2022-08-02 NOTE — TELEPHONE ENCOUNTER
Joseph Leonard,  Can advise the patient to stop Losartan. She can take Benadryl for itching. Add as an allergy to list.  Add amlodipine 5 mg daily. Advise to keep log and call back in 2 weeks.   Thanks Ronn Lanes

## 2022-08-02 NOTE — TELEPHONE ENCOUNTER
Received a call from patient's daughter, she advised that patient was started on losartan last Wednesday and on Sunday night she broke out with a rash on her back. She did not have a lot of information but thought it was a fine red raised rash that itched but only on her back. I tried to reach patient to get more information but her phone was shut off. They are questioning if patient may be having reaction from losartan.

## 2022-08-03 RX ORDER — AMLODIPINE BESYLATE 5 MG/1
5 TABLET ORAL DAILY
Qty: 90 TABLET | Refills: 3 | Status: SHIPPED | OUTPATIENT
Start: 2022-08-03

## 2022-08-16 ENCOUNTER — TELEPHONE (OUTPATIENT)
Dept: CARDIOLOGY CLINIC | Age: 82
End: 2022-08-16

## 2022-08-16 DIAGNOSIS — R55 SYNCOPE, UNSPECIFIED SYNCOPE TYPE: ICD-10-CM

## 2022-08-16 DIAGNOSIS — R42 EPISODE OF DIZZINESS: ICD-10-CM

## 2022-08-16 DIAGNOSIS — R55 SYNCOPE, UNSPECIFIED SYNCOPE TYPE: Primary | ICD-10-CM

## 2022-08-16 DIAGNOSIS — I44.7 LEFT BUNDLE BRANCH BLOCK (LBBB): ICD-10-CM

## 2022-08-16 PROCEDURE — 93244 EXT ECG>48HR<7D REV&INTERPJ: CPT | Performed by: NURSE PRACTITIONER

## 2022-08-16 NOTE — TELEPHONE ENCOUNTER
Piter report reviewed. Date 7/27/22-8/3/22  Analysis time 6 days and 14 hours. Underlying rhythm - NSR  1st degree AVB  Average heart rate 65  Minimum heart rate 47 at 3:11 am.  Max heart rate 146 with 13.6 seconds SVT. 18 SVT runs longest 13.9 seconds. No VT, pauses, 2nd or 3rd degree heart block or AF. Rare PAC and PVC. No patient triggers.   tlm

## 2023-03-21 ENCOUNTER — OFFICE VISIT (OUTPATIENT)
Dept: GASTROENTEROLOGY | Facility: CLINIC | Age: 83
End: 2023-03-21
Payer: MEDICARE

## 2023-03-21 VITALS
HEART RATE: 65 BPM | WEIGHT: 173 LBS | OXYGEN SATURATION: 96 % | BODY MASS INDEX: 32.66 KG/M2 | DIASTOLIC BLOOD PRESSURE: 76 MMHG | TEMPERATURE: 96.9 F | SYSTOLIC BLOOD PRESSURE: 142 MMHG | HEIGHT: 61 IN

## 2023-03-21 DIAGNOSIS — R19.7 DIARRHEA, UNSPECIFIED TYPE: ICD-10-CM

## 2023-03-21 DIAGNOSIS — K21.9 GASTROESOPHAGEAL REFLUX DISEASE, UNSPECIFIED WHETHER ESOPHAGITIS PRESENT: ICD-10-CM

## 2023-03-21 DIAGNOSIS — K62.5 RECTAL BLEEDING: Primary | ICD-10-CM

## 2023-03-21 PROCEDURE — 99204 OFFICE O/P NEW MOD 45 MIN: CPT | Performed by: NURSE PRACTITIONER

## 2023-03-21 PROCEDURE — 1159F MED LIST DOCD IN RCRD: CPT | Performed by: NURSE PRACTITIONER

## 2023-03-21 PROCEDURE — 1160F RVW MEDS BY RX/DR IN RCRD: CPT | Performed by: NURSE PRACTITIONER

## 2023-03-21 RX ORDER — MEMANTINE HYDROCHLORIDE 10 MG/1
1 TABLET ORAL DAILY
COMMUNITY
Start: 2023-01-21

## 2023-03-21 RX ORDER — OMEPRAZOLE 40 MG/1
40 CAPSULE, DELAYED RELEASE ORAL DAILY
COMMUNITY

## 2023-03-21 RX ORDER — DONEPEZIL HYDROCHLORIDE 10 MG/1
1 TABLET, FILM COATED ORAL DAILY
COMMUNITY

## 2023-03-21 RX ORDER — LEVOTHYROXINE SODIUM 0.07 MG/1
75 TABLET ORAL DAILY
COMMUNITY

## 2023-03-21 RX ORDER — MONTELUKAST SODIUM 4 MG/1
1.5 TABLET, CHEWABLE ORAL DAILY
COMMUNITY
Start: 2023-01-21

## 2023-03-21 RX ORDER — OXYBUTYNIN CHLORIDE 10 MG/1
1 TABLET, EXTENDED RELEASE ORAL DAILY
COMMUNITY
Start: 2023-02-10

## 2023-03-21 NOTE — PROGRESS NOTES
"Primary Physician: Florina Robbins APRN    Chief Complaint   Patient presents with   • Rectal Bleeding     Pt has been passing BRBPR off and on for the last 3-4 months-states she usually only sees it when she wipes, not in the toilet; Pt has had a colonoscopy in the past at [facility] but doesn't remember how long ago-states the doctor had a hard time \"getting past the curve\" so she had an x-ray but everything was normal        Subjective     Alis Gonzalez is a 82 y.o. female.    HPI   Blood per rectum   Pt reports she has had bright red blood intermittent for the past 3 months.  Pt denies any change in her bowel pattern. No diarrhea or constipation.  NO abd pain or rectal pain. She does have urgency as well. She had been started on Colestid which has helped.  No family hx colon cancer.  Pt reports having external hemorrhoids and other than bleeding they do not bother her so no rectal pain.  Pt reports having colonoscopy 2 years ago in [facility] and she reports it was incomplete due to the fact they couldn't get all the way through. We will send for those records.      Addendum 3/29/2023: Colonoscopy report received from [facility].  Colonoscopy was last performed 8/24/2020 incomplete to the sigmoid colon.  There was significant hemorrhoids with anal skin tags.  Examination was terminated as Dr. Avery was unable to get past the rectosigmoid area.    GERD  Pt has frequent acid reflux.  She is taking Omeprazole.  No dysphagia. No previous upper endoscopy.  No melena to report.  No family hx esophageal or stomach cancers.        Past Medical History:   Diagnosis Date   • Anxiety    • COPD (chronic obstructive pulmonary disease) (HCC)    • GERD (gastroesophageal reflux disease)    • Hypercholesteremia    • Hypertension    • Hypothyroidism    • Restless leg        Past Surgical History:   Procedure Laterality Date   • BREAST BIOPSY Left    • CHOLECYSTECTOMY     • COLONOSCOPY  " 2020    Dr. Gilbert Avery-Incomplete colonoscopy to the sigmoid colon-unable to pass scope farther even with lower abdominal pressure and multiple positional changes; Significant hemorrhoids with anal skin tags   • HYSTERECTOMY     • LUMBAR LAMINECTOMY          Current Outpatient Medications:   •  atenolol (TENORMIN) 50 MG tablet, Take 25 mg by mouth Daily., Disp: , Rfl:   •  carbidopa-levodopa (SINEMET)  MG per tablet, Take 1 tablet by mouth 3 (Three) Times a Day., Disp: , Rfl:   •  clonazePAM (KlonoPIN) 2 MG tablet, Take 1 tablet by mouth Daily., Disp: , Rfl:   •  colestipol (COLESTID) 1 g tablet, Take 1.5 tablets by mouth Daily., Disp: , Rfl:   •  donepezil (ARICEPT) 10 MG tablet, Take 1 tablet by mouth Daily., Disp: , Rfl:   •  levothyroxine (SYNTHROID, LEVOTHROID) 75 MCG tablet, Take 1 tablet by mouth Daily., Disp: , Rfl:   •  memantine (NAMENDA) 10 MG tablet, Take 1 tablet by mouth Daily., Disp: , Rfl:   •  omeprazole (priLOSEC) 40 MG capsule, Take 1 capsule by mouth Daily., Disp: , Rfl:   •  oxybutynin XL (DITROPAN-XL) 10 MG 24 hr tablet, Take 1 tablet by mouth Daily., Disp: , Rfl:   •  rOPINIRole (REQUIP) 0.5 MG tablet, Take 1 tablet by mouth Every Night., Disp: , Rfl:   •  sertraline (ZOLOFT) 50 MG tablet, Take 1 tablet by mouth Daily., Disp: , Rfl:   •  simvastatin (ZOCOR) 40 MG tablet, Take 1 tablet by mouth Every Night., Disp: , Rfl:     No Known Allergies    Social History     Socioeconomic History   • Marital status:    Tobacco Use   • Smoking status: Former     Types: Cigarettes     Quit date:      Years since quittin.2   • Smokeless tobacco: Never   Vaping Use   • Vaping Use: Never used   Substance and Sexual Activity   • Alcohol use: Not Currently   • Drug use: Defer   • Sexual activity: Defer       Family History   Problem Relation Age of Onset   • No Known Problems Mother    • No Known Problems Father    • Colon cancer Neg Hx    • Colon polyps Neg Hx    • Esophageal  "cancer Neg Hx    • Liver cancer Neg Hx    • Stomach cancer Neg Hx    • Liver disease Neg Hx    • Rectal cancer Neg Hx        Review of Systems   Constitutional: Negative for unexpected weight change.   Respiratory: Negative for shortness of breath.    Cardiovascular: Negative for chest pain.       Objective     /76 (BP Location: Left arm, Patient Position: Sitting, Cuff Size: Adult)   Pulse 65   Temp 96.9 °F (36.1 °C) (Infrared)   Ht 154.9 cm (61\")   Wt 78.5 kg (173 lb)   SpO2 96%   Breastfeeding No   BMI 32.69 kg/m²     Physical Exam  Vitals reviewed.   Constitutional:       Appearance: Normal appearance.   Cardiovascular:      Rate and Rhythm: Normal rate and regular rhythm.      Heart sounds: Normal heart sounds.   Pulmonary:      Effort: Pulmonary effort is normal.   Neurological:      Mental Status: She is alert.               IMPRESSION/PLAN:    Assessment & Plan      Problem List Items Addressed This Visit        Gastrointestinal Abdominal     Rectal bleeding - Primary    Overview     Last colonoscopy 8/24/2020 at [facility] per Dr. Gilbert Avery: Incomplete colonoscopy to the sigmoid colon, significant hemorrhoids with anal skin tags         Relevant Orders    Case Request (Completed)    Diarrhea    Overview     Chronic, with urgency, maintained on Colestid         GERD (gastroesophageal reflux disease)    Overview     Frequent acid reflux, despite taking Omeprazole, no previous endoscopy         Relevant Medications    omeprazole (priLOSEC) 40 MG capsule     Colonoscopy per Dr Rad Javier Prep          ..The risks, benefits, and alternatives of colonoscopy were reviewed with the patient today.  Risks including perforation of the colon possibly requiring surgery or colostomy.  Additional risks include risk of bleeding from biopsies or removal of colon tissue.  There is also the risk of a drug reaction or problems with anesthesia.  This will be discussed with the further by the " anesthesia team on the day of the procedure.  Lastly there is a possibility of missing a colon polyp or cancer.  The benefits include the diagnosis and management of disease of the colon and rectum.  Alternatives to colonoscopy include barium enema, laboratory testing, radiographic evaluation, or no intervention.  The patient verbalizes understanding and agrees.    In accordance with requirements under the Affordable Care Act, [facility] has provided pricing for all hospital services and items on each of its websites. However, a patient's actual cost may differ based on the services the patient receives to meet individual healthcare needs and based on the benefits provided under the patient’s insurance coverage.        Homa Diop, APRN  03/29/23  16:14 CDT    Part of this note may be an electronic transcription/translation of spoken language to printed text.

## 2023-03-21 NOTE — H&P (VIEW-ONLY)
"Primary Physician: Florina Robbins APRN    Chief Complaint   Patient presents with   • Rectal Bleeding     Pt has been passing BRBPR off and on for the last 3-4 months-states she usually only sees it when she wipes, not in the toilet; Pt has had a colonoscopy in the past at [facility] but doesn't remember how long ago-states the doctor had a hard time \"getting past the curve\" so she had an x-ray but everything was normal        Subjective     Alis Gonzalez is a 82 y.o. female.    HPI   Blood per rectum   Pt reports she has had bright red blood intermittent for the past 3 months.  Pt denies any change in her bowel pattern. No diarrhea or constipation.  NO abd pain or rectal pain. She does have urgency as well. She had been started on Colestid which has helped.  No family hx colon cancer.  Pt reports having external hemorrhoids and other than bleeding they do not bother her so no rectal pain.  Pt reports having colonoscopy 2 years ago in [facility] and she reports it was incomplete due to the fact they couldn't get all the way through. We will send for those records.      Addendum 3/29/2023: Colonoscopy report received from [facility].  Colonoscopy was last performed 8/24/2020 incomplete to the sigmoid colon.  There was significant hemorrhoids with anal skin tags.  Examination was terminated as Dr. Avery was unable to get past the rectosigmoid area.    GERD  Pt has frequent acid reflux.  She is taking Omeprazole.  No dysphagia. No previous upper endoscopy.  No melena to report.  No family hx esophageal or stomach cancers.        Past Medical History:   Diagnosis Date   • Anxiety    • COPD (chronic obstructive pulmonary disease) (HCC)    • GERD (gastroesophageal reflux disease)    • Hypercholesteremia    • Hypertension    • Hypothyroidism    • Restless leg        Past Surgical History:   Procedure Laterality Date   • BREAST BIOPSY Left    • CHOLECYSTECTOMY     • COLONOSCOPY  " 2020    Dr. Gilbert Avery-Incomplete colonoscopy to the sigmoid colon-unable to pass scope farther even with lower abdominal pressure and multiple positional changes; Significant hemorrhoids with anal skin tags   • HYSTERECTOMY     • LUMBAR LAMINECTOMY          Current Outpatient Medications:   •  atenolol (TENORMIN) 50 MG tablet, Take 25 mg by mouth Daily., Disp: , Rfl:   •  carbidopa-levodopa (SINEMET)  MG per tablet, Take 1 tablet by mouth 3 (Three) Times a Day., Disp: , Rfl:   •  clonazePAM (KlonoPIN) 2 MG tablet, Take 1 tablet by mouth Daily., Disp: , Rfl:   •  colestipol (COLESTID) 1 g tablet, Take 1.5 tablets by mouth Daily., Disp: , Rfl:   •  donepezil (ARICEPT) 10 MG tablet, Take 1 tablet by mouth Daily., Disp: , Rfl:   •  levothyroxine (SYNTHROID, LEVOTHROID) 75 MCG tablet, Take 1 tablet by mouth Daily., Disp: , Rfl:   •  memantine (NAMENDA) 10 MG tablet, Take 1 tablet by mouth Daily., Disp: , Rfl:   •  omeprazole (priLOSEC) 40 MG capsule, Take 1 capsule by mouth Daily., Disp: , Rfl:   •  oxybutynin XL (DITROPAN-XL) 10 MG 24 hr tablet, Take 1 tablet by mouth Daily., Disp: , Rfl:   •  rOPINIRole (REQUIP) 0.5 MG tablet, Take 1 tablet by mouth Every Night., Disp: , Rfl:   •  sertraline (ZOLOFT) 50 MG tablet, Take 1 tablet by mouth Daily., Disp: , Rfl:   •  simvastatin (ZOCOR) 40 MG tablet, Take 1 tablet by mouth Every Night., Disp: , Rfl:     No Known Allergies    Social History     Socioeconomic History   • Marital status:    Tobacco Use   • Smoking status: Former     Types: Cigarettes     Quit date:      Years since quittin.2   • Smokeless tobacco: Never   Vaping Use   • Vaping Use: Never used   Substance and Sexual Activity   • Alcohol use: Not Currently   • Drug use: Defer   • Sexual activity: Defer       Family History   Problem Relation Age of Onset   • No Known Problems Mother    • No Known Problems Father    • Colon cancer Neg Hx    • Colon polyps Neg Hx    • Esophageal  "cancer Neg Hx    • Liver cancer Neg Hx    • Stomach cancer Neg Hx    • Liver disease Neg Hx    • Rectal cancer Neg Hx        Review of Systems   Constitutional: Negative for unexpected weight change.   Respiratory: Negative for shortness of breath.    Cardiovascular: Negative for chest pain.       Objective     /76 (BP Location: Left arm, Patient Position: Sitting, Cuff Size: Adult)   Pulse 65   Temp 96.9 °F (36.1 °C) (Infrared)   Ht 154.9 cm (61\")   Wt 78.5 kg (173 lb)   SpO2 96%   Breastfeeding No   BMI 32.69 kg/m²     Physical Exam  Vitals reviewed.   Constitutional:       Appearance: Normal appearance.   Cardiovascular:      Rate and Rhythm: Normal rate and regular rhythm.      Heart sounds: Normal heart sounds.   Pulmonary:      Effort: Pulmonary effort is normal.   Neurological:      Mental Status: She is alert.               IMPRESSION/PLAN:    Assessment & Plan      Problem List Items Addressed This Visit        Gastrointestinal Abdominal     Rectal bleeding - Primary    Overview     Last colonoscopy 8/24/2020 at [facility] per Dr. Gilbert Avery: Incomplete colonoscopy to the sigmoid colon, significant hemorrhoids with anal skin tags         Relevant Orders    Case Request (Completed)    Diarrhea    Overview     Chronic, with urgency, maintained on Colestid         GERD (gastroesophageal reflux disease)    Overview     Frequent acid reflux, despite taking Omeprazole, no previous endoscopy         Relevant Medications    omeprazole (priLOSEC) 40 MG capsule     Colonoscopy per Dr Rad Javier Prep          ..The risks, benefits, and alternatives of colonoscopy were reviewed with the patient today.  Risks including perforation of the colon possibly requiring surgery or colostomy.  Additional risks include risk of bleeding from biopsies or removal of colon tissue.  There is also the risk of a drug reaction or problems with anesthesia.  This will be discussed with the further by the " anesthesia team on the day of the procedure.  Lastly there is a possibility of missing a colon polyp or cancer.  The benefits include the diagnosis and management of disease of the colon and rectum.  Alternatives to colonoscopy include barium enema, laboratory testing, radiographic evaluation, or no intervention.  The patient verbalizes understanding and agrees.    In accordance with requirements under the Affordable Care Act, [facility] has provided pricing for all hospital services and items on each of its websites. However, a patient's actual cost may differ based on the services the patient receives to meet individual healthcare needs and based on the benefits provided under the patient’s insurance coverage.        Homa Diop, APRN  03/29/23  16:14 CDT    Part of this note may be an electronic transcription/translation of spoken language to printed text.

## 2023-04-13 ENCOUNTER — ANESTHESIA (OUTPATIENT)
Dept: GASTROENTEROLOGY | Facility: HOSPITAL | Age: 83
End: 2023-04-13
Payer: MEDICARE

## 2023-04-13 ENCOUNTER — ANESTHESIA EVENT (OUTPATIENT)
Dept: GASTROENTEROLOGY | Facility: HOSPITAL | Age: 83
End: 2023-04-13
Payer: MEDICARE

## 2023-04-13 ENCOUNTER — HOSPITAL ENCOUNTER (OUTPATIENT)
Facility: HOSPITAL | Age: 83
Setting detail: HOSPITAL OUTPATIENT SURGERY
Discharge: HOME | End: 2023-04-13
Attending: INTERNAL MEDICINE | Admitting: INTERNAL MEDICINE
Payer: MEDICARE

## 2023-04-13 VITALS
WEIGHT: 172 LBS | TEMPERATURE: 98.2 F | RESPIRATION RATE: 11 BRPM | SYSTOLIC BLOOD PRESSURE: 130 MMHG | DIASTOLIC BLOOD PRESSURE: 63 MMHG | BODY MASS INDEX: 32.47 KG/M2 | HEIGHT: 61 IN | HEART RATE: 54 BPM | OXYGEN SATURATION: 93 %

## 2023-04-13 DIAGNOSIS — K62.5 RECTAL BLEEDING: ICD-10-CM

## 2023-04-13 PROCEDURE — 25010000002 PROPOFOL 10 MG/ML EMULSION: Performed by: NURSE ANESTHETIST, CERTIFIED REGISTERED

## 2023-04-13 PROCEDURE — 45381 COLONOSCOPY SUBMUCOUS NJX: CPT | Performed by: INTERNAL MEDICINE

## 2023-04-13 PROCEDURE — 45385 COLONOSCOPY W/LESION REMOVAL: CPT | Performed by: INTERNAL MEDICINE

## 2023-04-13 PROCEDURE — 88305 TISSUE EXAM BY PATHOLOGIST: CPT | Performed by: INTERNAL MEDICINE

## 2023-04-13 DEVICE — DEV CLIP HEMO RESOLUTION360/ULTR 235CM 17MM STRL: Type: IMPLANTABLE DEVICE | Site: COLON | Status: FUNCTIONAL

## 2023-04-13 DEVICE — DEV CLIP ENDO RESOLUTION360 CONTRL ROT 235CM: Type: IMPLANTABLE DEVICE | Site: COLON | Status: FUNCTIONAL

## 2023-04-13 RX ORDER — PROPOFOL 10 MG/ML
VIAL (ML) INTRAVENOUS AS NEEDED
Status: DISCONTINUED | OUTPATIENT
Start: 2023-04-13 | End: 2023-04-13 | Stop reason: SURG

## 2023-04-13 RX ORDER — SODIUM CHLORIDE 9 MG/ML
500 INJECTION, SOLUTION INTRAVENOUS CONTINUOUS PRN
Status: DISCONTINUED | OUTPATIENT
Start: 2023-04-13 | End: 2023-04-13 | Stop reason: HOSPADM

## 2023-04-13 RX ORDER — LIDOCAINE HYDROCHLORIDE 10 MG/ML
0.5 INJECTION, SOLUTION EPIDURAL; INFILTRATION; INTRACAUDAL; PERINEURAL ONCE AS NEEDED
Status: DISCONTINUED | OUTPATIENT
Start: 2023-04-13 | End: 2023-04-13 | Stop reason: HOSPADM

## 2023-04-13 RX ORDER — SODIUM CHLORIDE 0.9 % (FLUSH) 0.9 %
10 SYRINGE (ML) INJECTION AS NEEDED
Status: DISCONTINUED | OUTPATIENT
Start: 2023-04-13 | End: 2023-04-13 | Stop reason: HOSPADM

## 2023-04-13 RX ORDER — LIDOCAINE HYDROCHLORIDE 20 MG/ML
INJECTION, SOLUTION EPIDURAL; INFILTRATION; INTRACAUDAL; PERINEURAL AS NEEDED
Status: DISCONTINUED | OUTPATIENT
Start: 2023-04-13 | End: 2023-04-13 | Stop reason: SURG

## 2023-04-13 RX ADMIN — SODIUM CHLORIDE 500 ML: 9 INJECTION, SOLUTION INTRAVENOUS at 08:52

## 2023-04-13 RX ADMIN — GLYCOPYRROLATE 0.2 MG: 0.2 INJECTION INTRAMUSCULAR; INTRAVENOUS at 10:57

## 2023-04-13 RX ADMIN — LIDOCAINE HYDROCHLORIDE 50 MG: 20 INJECTION, SOLUTION EPIDURAL; INFILTRATION; INTRACAUDAL; PERINEURAL at 10:34

## 2023-04-13 RX ADMIN — PROPOFOL INJECTABLE EMULSION 550 MG: 10 INJECTION, EMULSION INTRAVENOUS at 10:34

## 2023-04-13 NOTE — DISCHARGE INSTRUCTIONS
You have had a clip placed in your colon to help prevent bleeding. It will come off on its own within 2 weeks. If you see it, flush it. DO NOT RETRIEVE IT. No MRI for 2 weeks unless approved by your physician. Keep your clip card for 2 weeks.     3/17AM pt passed loose BM along with large amount of BRBPR  - GI following  - s/p Flex sigmoidoscopy: localized ulceration and erosive with no bleeding in the rectum, suggestive of solitary ulcer syndrome, Evidence of prior hemoclip was found in the sigmoid colon.  -Avoid anal digitation and enemas   -Bowel regimen with miralax and dulcolax to avoid constipation.  -Repeat Endoscopy in 3 months - LVEF 25-30% (likely ischemic).    - Pt euvolemic on exam.    - CONT: Hydralazine 50mg PO q8hrs, Isordil 20mg PO TID, Lopressor 12.5mg PO BID, Losartan 100mg PO QD.

## 2023-04-13 NOTE — ANESTHESIA POSTPROCEDURE EVALUATION
Patient: Alis Gonzalez    Procedure Summary     Date: 04/13/23 Room / Location: [facility] ENDOSCOPY 2 /  [facility]    Anesthesia Start: 1029 Anesthesia Stop: 1118    Procedure: COLONOSCOPY WITH ANESTHESIA Diagnosis:       Rectal bleeding      (Rectal bleeding [K62.5])    Surgeons: Christa Leroy MD Provider: Silviano Castellon CRNA    Anesthesia Type: MAC ASA Status: 3          Anesthesia Type: MAC    Vitals  Vitals Value Taken Time   /63 04/13/23 1142   Temp     Pulse 58 04/13/23 1143   Resp 11 04/13/23 1140   SpO2 93 % 04/13/23 1143   Vitals shown include unvalidated device data.        Post Anesthesia Care and Evaluation    Patient location during evaluation: PHASE II  Level of consciousness: awake  Pain management: adequate    Airway patency: patent  Anesthetic complications: No anesthetic complications  PONV Status: noneRespiratory status: acceptable  Hydration status: acceptable

## 2023-04-13 NOTE — ANESTHESIA PREPROCEDURE EVALUATION
Anesthesia Evaluation     Patient summary reviewed   no history of anesthetic complications:  NPO Solid Status: > 8 hours             Airway   Mallampati: II  TM distance: >3 FB  Dental    (+) upper dentures    Pulmonary    (+) a smoker Former, COPD,   (-) sleep apnea  Cardiovascular   Exercise tolerance: good (4-7 METS)    (+) hypertension, hyperlipidemia,       Neuro/Psych  (+) tremors,    (-) seizures, TIA, CVA    ROS Comment: Concern for Parkinsons  GI/Hepatic/Renal/Endo    (+)  GI bleeding , thyroid problem   (-) liver disease, no renal disease, diabetes    Musculoskeletal     Abdominal    Substance History      OB/GYN          Other                        Anesthesia Plan    ASA 3     MAC       Anesthetic plan, risks, benefits, and alternatives have been provided, discussed and informed consent has been obtained with: patient.        CODE STATUS:

## 2023-04-15 LAB
CYTO UR: NORMAL
LAB AP CASE REPORT: NORMAL
Lab: NORMAL
PATH REPORT.FINAL DX SPEC: NORMAL
PATH REPORT.GROSS SPEC: NORMAL

## 2023-04-21 PROBLEM — Z86.010 HISTORY OF ADENOMATOUS POLYP OF COLON: Status: ACTIVE | Noted: 2023-04-21

## 2023-05-05 ENCOUNTER — HOSPITAL ENCOUNTER (EMERGENCY)
Facility: HOSPITAL | Age: 83
Discharge: HOME | End: 2023-05-06
Attending: STUDENT IN AN ORGANIZED HEALTH CARE EDUCATION/TRAINING PROGRAM
Payer: MEDICARE

## 2023-05-05 DIAGNOSIS — N39.0 URINARY TRACT INFECTION WITHOUT HEMATURIA, SITE UNSPECIFIED: Primary | ICD-10-CM

## 2023-05-05 PROCEDURE — 99283 EMERGENCY DEPT VISIT LOW MDM: CPT

## 2023-05-06 ENCOUNTER — APPOINTMENT (OUTPATIENT)
Dept: CT IMAGING | Facility: HOSPITAL | Age: 83
End: 2023-05-06
Payer: MEDICARE

## 2023-05-06 VITALS
RESPIRATION RATE: 18 BRPM | WEIGHT: 174 LBS | TEMPERATURE: 98.9 F | SYSTOLIC BLOOD PRESSURE: 187 MMHG | HEIGHT: 61 IN | DIASTOLIC BLOOD PRESSURE: 56 MMHG | BODY MASS INDEX: 32.85 KG/M2 | HEART RATE: 62 BPM | OXYGEN SATURATION: 95 %

## 2023-05-06 LAB
ALBUMIN SERPL-MCNC: 4.3 G/DL (ref 3.5–5.2)
ALBUMIN/GLOB SERPL: 1.7 G/DL
ALP SERPL-CCNC: 89 U/L (ref 39–117)
ALT SERPL W P-5'-P-CCNC: 21 U/L (ref 1–33)
ANION GAP SERPL CALCULATED.3IONS-SCNC: 13 MMOL/L (ref 5–15)
AST SERPL-CCNC: 24 U/L (ref 1–32)
BACTERIA UR QL AUTO: ABNORMAL /HPF
BASOPHILS # BLD AUTO: 0.08 10*3/MM3 (ref 0–0.2)
BASOPHILS NFR BLD AUTO: 0.8 % (ref 0–1.5)
BILIRUB SERPL-MCNC: 0.6 MG/DL (ref 0–1.2)
BILIRUB UR QL STRIP: NEGATIVE
BUN SERPL-MCNC: 10 MG/DL (ref 8–23)
BUN/CREAT SERPL: 17.2 (ref 7–25)
CALCIUM SPEC-SCNC: 9.3 MG/DL (ref 8.6–10.5)
CHLORIDE SERPL-SCNC: 105 MMOL/L (ref 98–107)
CK SERPL-CCNC: 75 U/L (ref 20–180)
CLARITY UR: ABNORMAL
CO2 SERPL-SCNC: 25 MMOL/L (ref 22–29)
COLOR UR: YELLOW
CREAT SERPL-MCNC: 0.58 MG/DL (ref 0.57–1)
DEPRECATED RDW RBC AUTO: 50 FL (ref 37–54)
EGFRCR SERPLBLD CKD-EPI 2021: 91 ML/MIN/1.73
EOSINOPHIL # BLD AUTO: 0.25 10*3/MM3 (ref 0–0.4)
EOSINOPHIL NFR BLD AUTO: 2.4 % (ref 0.3–6.2)
ERYTHROCYTE [DISTWIDTH] IN BLOOD BY AUTOMATED COUNT: 14.4 % (ref 12.3–15.4)
FLUAV RNA RESP QL NAA+PROBE: NOT DETECTED
FLUBV RNA RESP QL NAA+PROBE: NOT DETECTED
GLOBULIN UR ELPH-MCNC: 2.6 GM/DL
GLUCOSE SERPL-MCNC: 101 MG/DL (ref 65–99)
GLUCOSE UR STRIP-MCNC: NEGATIVE MG/DL
HCT VFR BLD AUTO: 43 % (ref 34–46.6)
HGB BLD-MCNC: 13.5 G/DL (ref 12–15.9)
HGB UR QL STRIP.AUTO: NEGATIVE
HYALINE CASTS UR QL AUTO: ABNORMAL /LPF
IMM GRANULOCYTES # BLD AUTO: 0.04 10*3/MM3 (ref 0–0.05)
IMM GRANULOCYTES NFR BLD AUTO: 0.4 % (ref 0–0.5)
KETONES UR QL STRIP: NEGATIVE
LEUKOCYTE ESTERASE UR QL STRIP.AUTO: ABNORMAL
LYMPHOCYTES # BLD AUTO: 4.25 10*3/MM3 (ref 0.7–3.1)
LYMPHOCYTES NFR BLD AUTO: 41 % (ref 19.6–45.3)
MAGNESIUM SERPL-MCNC: 1.8 MG/DL (ref 1.6–2.4)
MCH RBC QN AUTO: 29.9 PG (ref 26.6–33)
MCHC RBC AUTO-ENTMCNC: 31.4 G/DL (ref 31.5–35.7)
MCV RBC AUTO: 95.3 FL (ref 79–97)
MONOCYTES # BLD AUTO: 0.56 10*3/MM3 (ref 0.1–0.9)
MONOCYTES NFR BLD AUTO: 5.4 % (ref 5–12)
NEUTROPHILS NFR BLD AUTO: 5.18 10*3/MM3 (ref 1.7–7)
NEUTROPHILS NFR BLD AUTO: 50 % (ref 42.7–76)
NITRITE UR QL STRIP: NEGATIVE
NRBC BLD AUTO-RTO: 0 /100 WBC (ref 0–0.2)
PH UR STRIP.AUTO: 6 [PH] (ref 5–8)
PLATELET # BLD AUTO: 221 10*3/MM3 (ref 140–450)
PMV BLD AUTO: 11.1 FL (ref 6–12)
POTASSIUM SERPL-SCNC: 3.7 MMOL/L (ref 3.5–5.2)
PROT SERPL-MCNC: 6.9 G/DL (ref 6–8.5)
PROT UR QL STRIP: NEGATIVE
QT INTERVAL: 488 MS
QTC INTERVAL: 488 MS
RBC # BLD AUTO: 4.51 10*6/MM3 (ref 3.77–5.28)
RBC # UR STRIP: ABNORMAL /HPF
REF LAB TEST METHOD: ABNORMAL
RSV RNA NPH QL NAA+NON-PROBE: NOT DETECTED
SARS-COV-2 RNA RESP QL NAA+PROBE: NOT DETECTED
SODIUM SERPL-SCNC: 143 MMOL/L (ref 136–145)
SP GR UR STRIP: 1.02 (ref 1–1.03)
SQUAMOUS #/AREA URNS HPF: ABNORMAL /HPF
UROBILINOGEN UR QL STRIP: ABNORMAL
WBC # UR STRIP: ABNORMAL /HPF
WBC NRBC COR # BLD: 10.36 10*3/MM3 (ref 3.4–10.8)

## 2023-05-06 PROCEDURE — 25010000002 CEFTRIAXONE PER 250 MG: Performed by: STUDENT IN AN ORGANIZED HEALTH CARE EDUCATION/TRAINING PROGRAM

## 2023-05-06 PROCEDURE — 96365 THER/PROPH/DIAG IV INF INIT: CPT

## 2023-05-06 PROCEDURE — 87637 SARSCOV2&INF A&B&RSV AMP PRB: CPT | Performed by: STUDENT IN AN ORGANIZED HEALTH CARE EDUCATION/TRAINING PROGRAM

## 2023-05-06 PROCEDURE — 25510000001 IOPAMIDOL PER 1 ML: Performed by: STUDENT IN AN ORGANIZED HEALTH CARE EDUCATION/TRAINING PROGRAM

## 2023-05-06 PROCEDURE — 87186 SC STD MICRODIL/AGAR DIL: CPT | Performed by: STUDENT IN AN ORGANIZED HEALTH CARE EDUCATION/TRAINING PROGRAM

## 2023-05-06 PROCEDURE — 70498 CT ANGIOGRAPHY NECK: CPT

## 2023-05-06 PROCEDURE — 80053 COMPREHEN METABOLIC PANEL: CPT | Performed by: STUDENT IN AN ORGANIZED HEALTH CARE EDUCATION/TRAINING PROGRAM

## 2023-05-06 PROCEDURE — 83735 ASSAY OF MAGNESIUM: CPT | Performed by: STUDENT IN AN ORGANIZED HEALTH CARE EDUCATION/TRAINING PROGRAM

## 2023-05-06 PROCEDURE — 70496 CT ANGIOGRAPHY HEAD: CPT

## 2023-05-06 PROCEDURE — 70450 CT HEAD/BRAIN W/O DYE: CPT

## 2023-05-06 PROCEDURE — 85025 COMPLETE CBC W/AUTO DIFF WBC: CPT | Performed by: STUDENT IN AN ORGANIZED HEALTH CARE EDUCATION/TRAINING PROGRAM

## 2023-05-06 PROCEDURE — 87077 CULTURE AEROBIC IDENTIFY: CPT | Performed by: STUDENT IN AN ORGANIZED HEALTH CARE EDUCATION/TRAINING PROGRAM

## 2023-05-06 PROCEDURE — 87086 URINE CULTURE/COLONY COUNT: CPT | Performed by: STUDENT IN AN ORGANIZED HEALTH CARE EDUCATION/TRAINING PROGRAM

## 2023-05-06 PROCEDURE — 81001 URINALYSIS AUTO W/SCOPE: CPT | Performed by: STUDENT IN AN ORGANIZED HEALTH CARE EDUCATION/TRAINING PROGRAM

## 2023-05-06 PROCEDURE — 93005 ELECTROCARDIOGRAM TRACING: CPT | Performed by: STUDENT IN AN ORGANIZED HEALTH CARE EDUCATION/TRAINING PROGRAM

## 2023-05-06 PROCEDURE — 82550 ASSAY OF CK (CPK): CPT | Performed by: STUDENT IN AN ORGANIZED HEALTH CARE EDUCATION/TRAINING PROGRAM

## 2023-05-06 RX ORDER — CEFDINIR 300 MG/1
300 CAPSULE ORAL 2 TIMES DAILY
Qty: 14 CAPSULE | Refills: 0 | Status: SHIPPED | OUTPATIENT
Start: 2023-05-06 | End: 2023-05-13

## 2023-05-06 RX ADMIN — IOPAMIDOL 100 ML: 755 INJECTION, SOLUTION INTRAVENOUS at 03:17

## 2023-05-06 RX ADMIN — CEFTRIAXONE 1 G: 1 INJECTION, POWDER, FOR SOLUTION INTRAMUSCULAR; INTRAVENOUS at 03:41

## 2023-05-06 RX ADMIN — SODIUM CHLORIDE, POTASSIUM CHLORIDE, SODIUM LACTATE AND CALCIUM CHLORIDE 500 ML: 600; 310; 30; 20 INJECTION, SOLUTION INTRAVENOUS at 01:21

## 2023-05-06 NOTE — DISCHARGE INSTRUCTIONS
It was very nice to meet you, Alis. Thank you for allowing us to take care of you today at [facility].    Your evaluation today did not show any emergent findings or have any emergent indications for admission to the hospital.  Please use the antibiotics like we discussed for further improvement and follow-up with your family doctor to have further testing and possible evaluation with an MRI done.  Please come back to the hospital if you have any worsening symptoms or new symptoms.    Please understand that an ER evaluation is just the start of your evaluation. We will do what we can, but we are often unable to fully figure out what is causing your symptoms from one evaluation. Thus, our primary goal is to determine whether you need to be evaluated in the hospital or if it is safe for you to go home and see other doctors such as a primary care physician or a specialist on an outpatient basis.     Like we discussed, it is VERY IMPORTANT that you follow up with your primary care doctor (call them to set up an appointment) within the next few days or as soon as possible so that you can be re-evaluated for improvement in your symptoms or for any other questions.     A copy of your results should be included in your paperwork. If you were prescribed any medications, please take them as directed or call us back with any questions.    Please return to the emergency room within 12-48 hours if you experience fever, chills, chest pain or shortness of breath, pain with inspiration/expiration, pain that travels to your arms, neck or back, nausea, vomiting, severe headache, tearing pain in your chest, dizziness, feel as though you are about to pass out, have any worsening symptoms, or any other concerns.

## 2023-05-08 ENCOUNTER — TELEPHONE (OUTPATIENT)
Dept: EMERGENCY DEPT | Facility: HOSPITAL | Age: 83
End: 2023-05-08
Payer: MEDICARE

## 2023-05-08 LAB — BACTERIA SPEC AEROBE CULT: ABNORMAL

## 2023-05-08 RX ORDER — NITROFURANTOIN 25; 75 MG/1; MG/1
100 CAPSULE ORAL 2 TIMES DAILY
Qty: 14 CAPSULE | Refills: 0 | Status: SHIPPED | OUTPATIENT
Start: 2023-05-08 | End: 2023-05-15

## 2023-05-08 NOTE — ED PROVIDER NOTES
Subjective   History of Present Illness  Patients family provides most of the history and states that they were concerned when she slept for too long today. They state that she is very active and usually sleeps for long periods of time the next day but this time she was harder to arouse and was having difficulty speaking after she wokeup. She currently feels back to normal. States that she does fall frequently. Denies any current chest pain, shortness of breath, numbness, tingling, vision changes, or neck. Has a slight headache on the right superior part of her head.          Review of Systems   All other systems reviewed and are negative.      Past Medical History:   Diagnosis Date   • Anxiety    • COPD (chronic obstructive pulmonary disease)    • GERD (gastroesophageal reflux disease)    • Hypercholesteremia    • Hypertension    • Hypothyroidism    • Restless leg        No Known Allergies    Past Surgical History:   Procedure Laterality Date   • BREAST BIOPSY Left    • CHOLECYSTECTOMY     • COLONOSCOPY  08/24/2020    Dr. Gilbert Avery-Incomplete colonoscopy to the sigmoid colon-unable to pass scope farther even with lower abdominal pressure and multiple positional changes; Significant hemorrhoids with anal skin tags   • COLONOSCOPY N/A 4/13/2023    Procedure: COLONOSCOPY WITH ANESTHESIA;  Surgeon: Christa Leroy MD;  Location: [facility] ENDOSCOPY;  Service: Gastroenterology;  Laterality: N/A;  pre: diarrhea. rectal bleed.   post: polyps. lala.   Florina Robbins, APRN   • EYE SURGERY     • HYSTERECTOMY     • LUMBAR LAMINECTOMY         Family History   Problem Relation Age of Onset   • Hypertension Mother    • Heart disease Father    • Colon cancer Neg Hx    • Colon polyps Neg Hx    • Esophageal cancer Neg Hx    • Liver cancer Neg Hx    • Stomach cancer Neg Hx    • Liver disease Neg Hx    • Rectal cancer Neg Hx        Social History     Socioeconomic History   • Marital status:    Tobacco Use   • Smoking  status: Former     Types: Cigarettes     Quit date:      Years since quittin.3   • Smokeless tobacco: Never   Vaping Use   • Vaping Use: Never used   Substance and Sexual Activity   • Alcohol use: Not Currently   • Drug use: Defer   • Sexual activity: Defer           Objective   Physical Exam  Vitals and nursing note reviewed.   Constitutional:       General: She is not in acute distress.     Appearance: Normal appearance. She is not toxic-appearing or diaphoretic.   HENT:      Head: Normocephalic and atraumatic.      Right Ear: External ear normal.      Left Ear: External ear normal.      Nose: Nose normal.      Mouth/Throat:      Mouth: Mucous membranes are moist.   Eyes:      General:         Right eye: No discharge.         Left eye: No discharge.      Extraocular Movements: Extraocular movements intact.      Conjunctiva/sclera: Conjunctivae normal.   Cardiovascular:      Rate and Rhythm: Normal rate and regular rhythm.      Pulses: Normal pulses.   Pulmonary:      Effort: Pulmonary effort is normal. No respiratory distress.      Breath sounds: No rhonchi.   Abdominal:      General: Abdomen is flat.      Tenderness: There is no abdominal tenderness. There is no guarding or rebound.   Musculoskeletal:         General: No deformity or signs of injury.      Cervical back: Normal range of motion. No rigidity or tenderness.   Skin:     General: Skin is warm.      Capillary Refill: Capillary refill takes less than 2 seconds.      Coloration: Skin is not jaundiced.   Neurological:      Mental Status: She is alert and oriented to person, place, and time. Mental status is at baseline.      Cranial Nerves: No cranial nerve deficit.      Sensory: No sensory deficit.      Motor: No weakness.      Gait: Gait normal.      Comments: Slightly off balance when she first stands up but is able to get back to normal within a few seconds.   Psychiatric:         Mood and Affect: Mood normal.         Behavior: Behavior  normal.         Thought Content: Thought content normal.         Judgment: Judgment normal.         Procedures           ED Course                                           Medical Decision Making  Alis Gonzalez is a very pleasant 83 y.o. female who presents to the ED for fatigue and headache.      Patient was non-toxic and not-ill appearing on arrival. Vital signs stable on arrival.     Patient's presentation raises suspicion for differentials including, but not limited to, CVA, ICH, infection, electrolyte abnormalities.     Given this, Alis was placed on the monitor. Laboratory studies and imaging studies were ordered including cbc, cmp, ua, CT head, CTA head, CTA neck.     Alis patient was given fluids for symptomatic relief.    ____    EKG:  All EKGs are interpreted by the Emergency Department Physician who either signs or Co-signs this chart in the absence of a cardiologist.    ECG 12 Lead Altered Mental Status   Final Result    Test Reason : Altered Mental Status    Blood Pressure :   */*   mmHG    Vent. Rate :  60 BPM     Atrial Rate :  60 BPM       P-R Int : 204 ms          QRS Dur : 148 ms        QT Int : 488 ms       P-R-T Axes : -12 -49 111 degrees       QTc Int : 488 ms        Normal sinus rhythm    First degree AV block    Left bundle branch block    Abnormal ECG    No previous ECGs available        Referred By: OMAYRA           Confirmed By: Kendell Koch MD     SCANNED EKG   Final Result       My EKG interpretation:   I have reviewed and interpreted the EKG to show sinus rhythm with a rate of 60. LBBB.  ____    Labs were reviewed and pertinent for a UTI. On re-evaluation, patient remained hemodynamically stable and appeared to be comfortable and in no acute distress.    Given findings described above, patient's presentation is most likely related to her UTI and her standing up too quickly after laying in bed. At this point, after reviewing the workup, I have a low suspicion for CVA. She does have some  mild carotid stenosis.    I had a discussion regarding the workup, results so far, and my impression so far. I said that it is important she follows up with her PCP and take the antibiotics. I said that based on the current workup, there is not an unstable medical condition requiring admission to the hospital. Her family is in agreement with this plan and will bring her back if they have other concerns.    I spent an appropriate amount of time necessary at the bedside preceding discharge so I could explain aftercare instructions, provide more patient eduction, give explanations of my interpretation of the evaluation, and to ensure that everyone understood the plan of care. I also discussed the fact that even after being discharged there could be an acute emergent condition that arises requiring further evaluation, admission, or even surgical intervention.     However, I said that it is VERY IMPORTANT that Alis follows up, by CALLING as soon as possible to set up an appointment, with the primary care doctor within the next few days or as soon as reasonably possible so that the symptoms can be re-evaluated for improvement or for any other questions.     I also gave Alis common sense return precautions and encouraged a quick return to the emergency department within 24 - 48hrs if there are ANY concerns, worsening of condition, new complaints, or if unable to seek follow-up in a timely fashion.     The patient verbalized understanding of the discharge instructions and agreed with them.     Alis was discharged in stable condition.      Signed by:   Anuel Luz MD 5/8/2023 16:36 CDT   Emergency Medicine Physician    Dragon disclaimer:  Part of this note may be an electronic transcription/translation of spoken language to printed text using the Dragon Dictation System.       Urinary tract infection without hematuria, site unspecified: complicated acute illness or injury  Amount and/or Complexity of Data  Reviewed  Labs: ordered.  Radiology: ordered.  ECG/medicine tests: ordered.      Risk  Prescription drug management.          Final diagnoses:   Urinary tract infection without hematuria, site unspecified       ED Disposition  ED Disposition     ED Disposition   Discharge    Condition   Stable    Comment   --             Florina Robbins, APRN  [address] [facility] [address] [phone]    Call in 1 day  As needed, If symptoms worsen         Medication List      New Prescriptions    cefdinir 300 MG capsule  Commonly known as: OMNICEF  Take 1 capsule by mouth 2 (Two) Times a Day for 7 days.           Where to Get Your Medications      These medications were sent to [facility] [address] [place] [address] - [facility].[address] [phone] [facility] [phone] [address] [address]    Phone: [phone]   · cefdinir 300 MG capsule          Anuel Luz MD  05/08/23 9786

## 2023-05-08 NOTE — TELEPHONE ENCOUNTER
Called to review urine culture need to stop Omnicef and initiate treatment with nitrofurantoin.  Patient denies any known allergies to this medication and would like it called to [facility].  Advised PCP follow-up within the next 48 hours, return precautions.  Patient appreciative with no further questions, concerns, needs.

## 2023-09-27 RX ORDER — MELOXICAM 7.5 MG/1
7.5 TABLET ORAL DAILY
COMMUNITY
End: 2023-09-28

## 2023-09-27 RX ORDER — NITROFURANTOIN MACROCRYSTALS 100 MG/1
100 CAPSULE ORAL 2 TIMES DAILY
COMMUNITY
End: 2023-09-28

## 2023-09-27 RX ORDER — NIFEDIPINE 60 MG/1
60 TABLET, EXTENDED RELEASE ORAL DAILY
COMMUNITY
End: 2023-09-28

## 2023-09-27 RX ORDER — OXYBUTYNIN CHLORIDE 10 MG/1
10 TABLET, EXTENDED RELEASE ORAL DAILY
COMMUNITY

## 2023-09-27 RX ORDER — ALBUTEROL SULFATE 90 UG/1
2 AEROSOL, METERED RESPIRATORY (INHALATION) EVERY 6 HOURS PRN
COMMUNITY

## 2023-09-27 RX ORDER — CHLORAL HYDRATE 500 MG
1000 CAPSULE ORAL DAILY
COMMUNITY
End: 2023-09-28

## 2023-09-28 ENCOUNTER — OFFICE VISIT (OUTPATIENT)
Dept: GASTROENTEROLOGY | Age: 83
End: 2023-09-28

## 2023-09-28 VITALS
WEIGHT: 177 LBS | HEART RATE: 72 BPM | OXYGEN SATURATION: 92 % | DIASTOLIC BLOOD PRESSURE: 80 MMHG | BODY MASS INDEX: 33.42 KG/M2 | HEIGHT: 61 IN | SYSTOLIC BLOOD PRESSURE: 139 MMHG

## 2023-09-28 DIAGNOSIS — K62.5 RECTAL BLEEDING: Primary | ICD-10-CM

## 2023-09-28 DIAGNOSIS — Z86.010 HISTORY OF COLON POLYPS: ICD-10-CM

## 2023-09-28 DIAGNOSIS — R10.9 ABDOMINAL CRAMPING: ICD-10-CM

## 2023-09-28 PROCEDURE — 1123F ACP DISCUSS/DSCN MKR DOCD: CPT | Performed by: NURSE PRACTITIONER

## 2023-09-28 PROCEDURE — 99204 OFFICE O/P NEW MOD 45 MIN: CPT | Performed by: NURSE PRACTITIONER

## 2023-09-28 NOTE — PROGRESS NOTES
Subjective:     Patient ID: Shy Head is a 82 y.o. female  PCP: Kathie Davalos  Referring Provider: Kathie Davalos CNP    HPI  Patient presents to the office today with the following complaints: New Patient and Rectal Bleeding      Patient seen in the office today with complaints of blood in her stool, bright red blood  She describes seeing the blood when she wipes and she also reports a history of hemorrhoids. Reports she is also having abd cramping at times  Colonoscopy was last done in April by Dr. Kailyn Bain   She reports she was supposed to have a follow up in 6 months to have the rest of a large polyp removed or to ensure that it was removed completely. Reports the reason she had the colonoscopy in April was for history of polyps and for rectal bleeding   Assessment:     1. Rectal bleeding  2. History of colon polyps  3. Abdominal cramping           Plan:   Schedule Colonoscopy  Instruct on bowel prep. Nothing to eat or drink after midnight the day of the exam.  Unable to drive for 24 hours after the procedure. No aspirin or nonsteroidal anti-inflammatories for 5 days before procedure. I have discussed the benefits, alternatives, and risks (including bleeding, perforation and death)  for pursuing Endoscopy (EGD/Colonscopy/EUS/ERCP) with the patient and they are willing to continue. We also discussed the need for anesthesia, IV access, proper dietary changes, medication changes if necessary, and need for bowel prep (if ordered) prior to their Endoscopic procedure. They are aware they must have someone accompany them to their scheduled procedure to drive them home - they agree to the above and are willing to continue. Orders  No orders of the defined types were placed in this encounter. Medications  No orders of the defined types were placed in this encounter.         Patient History:     Past Medical History:   Diagnosis Date    Arthritis     Asthma     Breast adenoma     COPD (chronic

## 2023-09-30 ASSESSMENT — ENCOUNTER SYMPTOMS
CHOKING: 0
SHORTNESS OF BREATH: 0
DIARRHEA: 0
COUGH: 0
BLOOD IN STOOL: 0
CONSTIPATION: 0
ABDOMINAL PAIN: 1
NAUSEA: 0
ANAL BLEEDING: 1
TROUBLE SWALLOWING: 0
RECTAL PAIN: 0
VOMITING: 0
ABDOMINAL DISTENTION: 0

## 2023-10-02 RX ORDER — SODIUM CHLORIDE, SODIUM LACTATE, POTASSIUM CHLORIDE, CALCIUM CHLORIDE 600; 310; 30; 20 MG/100ML; MG/100ML; MG/100ML; MG/100ML
INJECTION, SOLUTION INTRAVENOUS CONTINUOUS
Status: CANCELLED | OUTPATIENT
Start: 2023-10-02

## 2023-10-04 ENCOUNTER — ANESTHESIA EVENT (OUTPATIENT)
Dept: OPERATING ROOM | Age: 83
End: 2023-10-04

## 2023-10-04 ENCOUNTER — APPOINTMENT (OUTPATIENT)
Dept: OPERATING ROOM | Age: 83
End: 2023-10-04
Attending: INTERNAL MEDICINE

## 2023-10-04 ENCOUNTER — ANESTHESIA (OUTPATIENT)
Dept: OPERATING ROOM | Age: 83
End: 2023-10-04

## 2023-10-04 ENCOUNTER — HOSPITAL ENCOUNTER (OUTPATIENT)
Age: 83
Setting detail: SPECIMEN
Discharge: HOME | End: 2023-10-04
Payer: MEDICARE

## 2023-10-04 ENCOUNTER — HOSPITAL ENCOUNTER (OUTPATIENT)
Age: 83
Setting detail: OUTPATIENT SURGERY
Discharge: HOME | End: 2023-10-04
Attending: INTERNAL MEDICINE | Admitting: INTERNAL MEDICINE
Payer: MEDICARE

## 2023-10-04 VITALS
HEART RATE: 61 BPM | DIASTOLIC BLOOD PRESSURE: 74 MMHG | RESPIRATION RATE: 18 BRPM | OXYGEN SATURATION: 94 % | HEIGHT: 61 IN | WEIGHT: 175 LBS | TEMPERATURE: 97.8 F | BODY MASS INDEX: 33.04 KG/M2 | SYSTOLIC BLOOD PRESSURE: 166 MMHG

## 2023-10-04 PROCEDURE — 88305 TISSUE EXAM BY PATHOLOGIST: CPT

## 2023-10-04 PROCEDURE — 45385 COLONOSCOPY W/LESION REMOVAL: CPT

## 2023-10-04 PROCEDURE — 45385 COLONOSCOPY W/LESION REMOVAL: CPT | Performed by: INTERNAL MEDICINE

## 2023-10-04 RX ORDER — PROPOFOL 10 MG/ML
INJECTION, EMULSION INTRAVENOUS CONTINUOUS PRN
Status: DISCONTINUED | OUTPATIENT
Start: 2023-10-04 | End: 2023-10-04 | Stop reason: SDUPTHER

## 2023-10-04 RX ORDER — LIDOCAINE HYDROCHLORIDE 10 MG/ML
INJECTION, SOLUTION INFILTRATION; PERINEURAL PRN
Status: DISCONTINUED | OUTPATIENT
Start: 2023-10-04 | End: 2023-10-04 | Stop reason: SDUPTHER

## 2023-10-04 RX ORDER — HYDROCHLOROTHIAZIDE 25 MG/1
25 TABLET ORAL DAILY
COMMUNITY

## 2023-10-04 RX ORDER — SODIUM CHLORIDE, SODIUM LACTATE, POTASSIUM CHLORIDE, CALCIUM CHLORIDE 600; 310; 30; 20 MG/100ML; MG/100ML; MG/100ML; MG/100ML
INJECTION, SOLUTION INTRAVENOUS CONTINUOUS
Status: DISCONTINUED | OUTPATIENT
Start: 2023-10-04 | End: 2023-10-04 | Stop reason: HOSPADM

## 2023-10-04 RX ADMIN — PROPOFOL 100 MCG/KG/MIN: 10 INJECTION, EMULSION INTRAVENOUS at 12:27

## 2023-10-04 RX ADMIN — SODIUM CHLORIDE, SODIUM LACTATE, POTASSIUM CHLORIDE, CALCIUM CHLORIDE: 600; 310; 30; 20 INJECTION, SOLUTION INTRAVENOUS at 11:30

## 2023-10-04 RX ADMIN — LIDOCAINE HYDROCHLORIDE 50 MG: 10 INJECTION, SOLUTION INFILTRATION; PERINEURAL at 12:27

## 2023-10-04 ASSESSMENT — PAIN - FUNCTIONAL ASSESSMENT: PAIN_FUNCTIONAL_ASSESSMENT: NONE - DENIES PAIN

## 2023-10-04 NOTE — OP NOTE
Patient: Samir Amaya : 1940  Med Rec#: [id number] Acc#: [id number]   Primary Care Provider Kasie Mondragon    Date of Procedure:  10/4/2023    Endoscopist: Armando Philippe MD, MD    Referring Provider: Kasie Mondragon,     Operation Performed: Colonoscopy up to the cecum with  Hot snare piecemeal resection of a transverse colon polyp and  Hot snare resection of ascending colon polyps    Indications:     1. Rectal bleeding  2. History of multiple adenomatous colon polyps on prior exam done in 2023 at [facility]  3. Abdominal cramping  4. COLONOSCOPY 2020   Dr. Guzman Cluck colonoscopy to the sigmoid colon-unable to pass scope farther even with lower abdominal pressure and multiple positional changes; Significant hemorrhoids with anal skin tags     Colonoscopy was last done in April by Dr. Justyn Dickens at [facility] here in town. She reports she was supposed to have a follow up in 6 months to have the rest of a large polyp removed or to ensure that it was removed completely. Anesthesia:  Sedation was administered by anesthesia who monitored the patient during the procedure. I met with Samir Amaya prior to procedure. We discussed the procedure itself, and I have discussed the risks of endoscopy (including-- but not limited to-- pain, discomfort, bleeding potentially requiring second endoscopic procedure and/or blood transfusion, organ perforation requiring operative repair, damage to organs near the colon, infection, aspiration, cardiopulmonary/allergic reaction), benefits, indications to endoscopy. Additionally, we discussed options other than colonoscopy. The patient expressed understanding. All questions answered. The patient decided to proceed with the procedure. Signed informed consent was placed on the chart.     Blood Loss: minimal    Withdrawal time: More than 9 minutes  Bowel Prep: Fair  with small amounts of thick solid and semisolid stool and a moderate amount of thick, opaque liquid scattered in patchy segments throughout the colon obscuring the underlying mucosa. Lesions including polyps may have been missed. Complications: no immediate complications    DESCRIPTION OF PROCEDURE:     A time out was performed. After written informed consent was obtained, the patient was placed in the left lateral position. The perianal area was inspected, and a digital rectal exam was performed. A rectal exam was performed: normal tone, no palpable lesions. At this point, a forward viewing Olympus colonoscope was inserted into the anus and carefully advanced to the cecum. The cecum was identified by the ileocecal valve and the appendiceal orifice. The colonoscope was then slowly withdrawn with careful inspection of the mucosa in a linear and circumferential fashion. The scope was retroflexed in the rectum. Suction was utilized during the procedure to remove as much air as possible from the bowel. The colonoscope was removed from the patient, and the procedure was terminated. Findings are listed below. Findings:   Evidence of previous polypectomy with application of 3 hemostatic metallic clips and [place] ink tattooing of the surrounding area noted in the hepatic flexure without any obvious residual or recurrent mass lesion. 2 small erosions underneath the hemostatic clips were seen without any stigmata of bleeding. 2 small 6 to 8 mm in diameter sessile ascending colon polyps were removed by hot snare polypectomy technique. An approximately 1.5 cm in size sessile villous appearing mid transverse colon polyp was resected in a piecemeal fashion and the fragments retrieved for pathology. NO larger polyps or masses or strictures or colitis. Suboptimal exam due to prep quality as described above. Moderate diverticulosis in the left colon. A redundant tortuous colon which made the procedure somewhat technically challenging.   Internal

## 2023-10-04 NOTE — H&P
Patient Name: Alexis Severance  : 1940  MRN: [id number]  DATE: 10/04/23    Allergies: Allergies   Allergen Reactions    Losartan Rash        ENDOSCOPY  History and Physical    Procedure:    [x] Diagnostic Colonoscopy       [] Screening Colonoscopy  [] EGD      [] ERCP      [] EUS       [] Other    [x] Previous office notes/History and Physical reviewed from the patients chart. Please see EMR for further details of HPI. I have examined the patient's status immediately prior to the procedure and:      Indications/HPI:      1. Rectal bleeding  2. History of colon polyps  3. Abdominal cramping    Colonoscopy was last done in April by Dr. Klaus Hatch at [facility]. She reports she was supposed to have a follow up in 6 months to have the rest of a large polyp removed or to ensure that it was removed completely. []Abdominal Pain   []Cancer- GI/Lung     []Fhx of colon CA/polyps  []History of Polyps  []Barretts            []Melena  []Abnormal Imaging              []Dysphagia              []Persistent Pneumonia   []Anemia                            []Food Impaction        []History of Polyps  [] GI Bleed             []Pulmonary nodule/Mass   []Change in bowel habits []Heartburn/Reflux  []Rectal Bleed (BRBPR)  []Chest Pain - Non Cardiac []Heme (+) Stool []Ulcers  []Constipation  []Hemoptysis  []Varices  []Diarrhea  []Hypoxemia    []Nausea/Vomiting   []Screening   []Crohns/Colitis  []Other:     Anesthesia:   [x] MAC [] Moderate Sedation   [] General   [] None     ROS: 12 pt Review of Symptoms was negative unless mentioned above    Medications:   Prior to Admission medications    Medication Sig Start Date End Date Taking?  Authorizing Provider   hydroCHLOROthiazide (HYDRODIURIL) 25 MG tablet Take 1 tablet by mouth daily   Yes Provider, MD Katy   oxybutynin (DITROPAN-XL) 10 MG extended release tablet Take 1 tablet by mouth daily    ProviderKaty MD   albuterol sulfate HFA (VENTOLIN HFA)

## 2023-10-04 NOTE — ANESTHESIA PRE PROCEDURE
Department of Anesthesiology  Preprocedure Note       Name:  Jamey Joe   Age:  82 y.o.  :  1940                                          MRN:  [id number]         Date:  10/4/2023      Surgeon: Erica Velazquez):  Demetrice Rodrigues MD    Procedure: Procedure(s):  COLONOSCOPY DIAGNOSTIC    Medications prior to admission:   Prior to Admission medications    Medication Sig Start Date End Date Taking? Authorizing Provider   hydroCHLOROthiazide (HYDRODIURIL) 25 MG tablet Take 1 tablet by mouth daily   Yes Katy Smalls MD   oxybutynin (DITROPAN-XL) 10 MG extended release tablet Take 1 tablet by mouth daily    Katy Smalls MD   albuterol sulfate HFA (VENTOLIN HFA) 108 (90 Base) MCG/ACT inhaler Inhale 2 puffs into the lungs every 6 hours as needed for Wheezing    Katy Smalls MD   carbidopa-levodopa (SINEMET)  MG per tablet carbidopa 25 mg-levodopa 100 mg tablet    Katy Smalls MD   colestipol (COLESTID) 1 g tablet Take 1.5 tablets by mouth daily    Katy Smalls MD   Cyanocobalamin (B-12) 2500 MCG TABS Take by mouth    Katy Smalls MD   donepezil (ARICEPT) 10 MG tablet Take 1 tablet by mouth daily    Katy Smalls MD   atenolol (TENORMIN) 50 MG tablet Take 0.5 tablets by mouth daily    Katy Smalls MD   clonazePAM (KLONOPIN) 2 MG tablet Take 1 tablet by mouth nightly.     Katy Smalls MD   levothyroxine (SYNTHROID) 75 MCG tablet Take 1 tablet by mouth Daily    Katy Smalls MD   memantine (NAMENDA) 10 MG tablet Take 1 tablet by mouth daily    Katy Smalls MD   omeprazole (PRILOSEC) 40 MG delayed release capsule Take 1 capsule by mouth daily    Katy Smalls MD   rOPINIRole (REQUIP) 5 MG tablet Take 1 tablet by mouth nightly    Katy Smalls MD   sertraline (ZOLOFT) 50 MG tablet Take 1 tablet by mouth daily    Katy Smalls MD   simvastatin (ZOCOR) 40 MG tablet Take 1 tablet by mouth every

## 2023-10-04 NOTE — ANESTHESIA POSTPROCEDURE EVALUATION
Department of Anesthesiology  Postprocedure Note    Patient: Lea Justice  MRN: [id number]  YOB: 1940  Date of evaluation: 10/4/2023      Procedure Summary     Date: 10/04/23 Room / Location: [facility] ENDO 02 / [address]    Anesthesia Start: 6357 Anesthesia Stop: 1252    Procedure: COLONOSCOPY POLYPECTOMY SNARE/COLD BIOPSY (Abdomen) Diagnosis:       Hx of colonic polyps      Rectal bleeding      (Hx of colonic polyps [Z86.010])      (Rectal bleeding [K62.5])    Surgeons: Jojo Mendoza MD Responsible Provider: ALBA Gaffney CRNA    Anesthesia Type: General, TIVA ASA Status: 3          Anesthesia Type: General, TIVA    Marguerite Phase I:      Marguerite Phase II:        Anesthesia Post Evaluation    Patient location during evaluation: bedside  Patient participation: complete - patient participated  Level of consciousness: responsive to verbal stimuli  Pain score: 0  Airway patency: patent  Nausea & Vomiting: no nausea and no vomiting  Complications: no  Cardiovascular status: hemodynamically stable  Respiratory status: acceptable  Hydration status: stable  Pain management: adequate

## 2023-10-04 NOTE — DISCHARGE INSTRUCTIONS
1. Repeat colonoscopy: pending pathology -in 6 months for surveillance based on patient's last 2 colonoscopy findings; sooner if she were to develop lower GI symptoms such as bleeding, abdominal pain, change in bowel habits or stool caliber or if the patient has anemia or unexplained weight loss in the future. 2. Await biopsy results-you will receive a letter with your results within 7-10 days    - Resume previous meds and diet  - GI clinic f/u PRN   - Keep scheduled f/u appts with other MDs     - NO ASA/NSAIDs x 2 weeks     NSAIDS Non-steroidal Anti-Inflammatory  You have been directed by your physician to avoid any NSAID's; the following medications are a list of those to avoid. If you think that you are taking any NSAID's notify your physician. Over The Counter  Advil                      Motrin  Nuprin                   Ibuprofen  Midol                     Aleve  Naproxen              Orudis  Aspirin                   [place]  Prescriptions and Generics  Cataflam              Relafen  Voltaren               Clinoril  Indocin                 Naproxen  Arthrotec              Lodine  Daypro                 Nalfon  Toradol                Ansaid  Feldene               Meclofenamate  Fenoprofen          Ponstel  Mobic                   Celebrex  Vioxx     POST-OP ORDERS: ENDOSCOPY & COLONOSCOPY:    1. Rest today. 2. DO NOT eat or drink until wide awake; eat your usual diet today in moderate amount only. 3. DO NOT drive today. 4. Call physician if you have severe pain, vomiting, fever, rectal bleeding or black bowel movements. 5.  If a biopsy was taken or a polyp removed, you should expect to hear results in about 21 days. If you have heard nothing from your physician by then, call the office for results. 6.  Discharge home when patient awake, vitals signs stable and tolerating liquids.

## 2024-03-04 ENCOUNTER — TELEPHONE (OUTPATIENT)
Dept: GASTROENTEROLOGY | Age: 84
End: 2024-03-04

## 2024-04-03 ENCOUNTER — TELEPHONE (OUTPATIENT)
Dept: GASTROENTEROLOGY | Age: 84
End: 2024-04-03

## 2024-04-03 NOTE — TELEPHONE ENCOUNTER
04- Patient called and stated that she lost her rx for her colon prep.  She is needing it faxed to [facility]       Faxed rx to [facility] [place] Fax [phone]

## 2024-04-15 ENCOUNTER — ANESTHESIA EVENT (OUTPATIENT)
Dept: OPERATING ROOM | Age: 84
End: 2024-04-15

## 2024-04-16 ENCOUNTER — HOSPITAL ENCOUNTER (OUTPATIENT)
Age: 84
Setting detail: SPECIMEN
Discharge: HOME | End: 2024-04-16
Payer: MEDICARE

## 2024-04-16 ENCOUNTER — APPOINTMENT (OUTPATIENT)
Dept: OPERATING ROOM | Age: 84
End: 2024-04-16
Attending: INTERNAL MEDICINE

## 2024-04-16 ENCOUNTER — ANESTHESIA (OUTPATIENT)
Dept: OPERATING ROOM | Age: 84
End: 2024-04-16

## 2024-04-16 ENCOUNTER — HOSPITAL ENCOUNTER (OUTPATIENT)
Age: 84
Setting detail: OUTPATIENT SURGERY
Discharge: HOME | End: 2024-04-16
Attending: INTERNAL MEDICINE | Admitting: INTERNAL MEDICINE
Payer: MEDICARE

## 2024-04-16 VITALS
HEART RATE: 61 BPM | HEIGHT: 61 IN | RESPIRATION RATE: 14 BRPM | TEMPERATURE: 97.2 F | SYSTOLIC BLOOD PRESSURE: 148 MMHG | DIASTOLIC BLOOD PRESSURE: 71 MMHG | WEIGHT: 170 LBS | BODY MASS INDEX: 32.1 KG/M2 | OXYGEN SATURATION: 92 %

## 2024-04-16 PROCEDURE — 45385 COLONOSCOPY W/LESION REMOVAL: CPT | Performed by: INTERNAL MEDICINE

## 2024-04-16 PROCEDURE — 45385 COLONOSCOPY W/LESION REMOVAL: CPT

## 2024-04-16 PROCEDURE — 88305 TISSUE EXAM BY PATHOLOGIST: CPT

## 2024-04-16 RX ORDER — LIDOCAINE HYDROCHLORIDE 10 MG/ML
INJECTION, SOLUTION INFILTRATION; PERINEURAL PRN
Status: DISCONTINUED | OUTPATIENT
Start: 2024-04-16 | End: 2024-04-16 | Stop reason: SDUPTHER

## 2024-04-16 RX ORDER — PROPOFOL 10 MG/ML
INJECTION, EMULSION INTRAVENOUS PRN
Status: DISCONTINUED | OUTPATIENT
Start: 2024-04-16 | End: 2024-04-16 | Stop reason: SDUPTHER

## 2024-04-16 RX ORDER — SODIUM CHLORIDE, SODIUM LACTATE, POTASSIUM CHLORIDE, CALCIUM CHLORIDE 600; 310; 30; 20 MG/100ML; MG/100ML; MG/100ML; MG/100ML
INJECTION, SOLUTION INTRAVENOUS CONTINUOUS
Status: DISCONTINUED | OUTPATIENT
Start: 2024-04-16 | End: 2024-04-16 | Stop reason: HOSPADM

## 2024-04-16 RX ADMIN — SODIUM CHLORIDE, SODIUM LACTATE, POTASSIUM CHLORIDE, CALCIUM CHLORIDE: 600; 310; 30; 20 INJECTION, SOLUTION INTRAVENOUS at 08:08

## 2024-04-16 RX ADMIN — PROPOFOL 150 MG: 10 INJECTION, EMULSION INTRAVENOUS at 09:42

## 2024-04-16 RX ADMIN — LIDOCAINE HYDROCHLORIDE 40 MG: 10 INJECTION, SOLUTION INFILTRATION; PERINEURAL at 09:42

## 2024-04-16 ASSESSMENT — PAIN - FUNCTIONAL ASSESSMENT
PAIN_FUNCTIONAL_ASSESSMENT: NONE - DENIES PAIN

## 2024-04-16 NOTE — DISCHARGE INSTRUCTIONS
1. Repeat colonoscopy: pending pathology -if biopsies reveal adenomatous changes, in 1 year; if biopsies reveal only granulation tissue, based on her age and comorbidities, she may not require routine colon cancer surveillance examine henceforth.  2. Await biopsy results-you will receive a letter with your results within 7-10 days    - Resume previous meds and diet  - GI clinic f/u PRN   - Keep scheduled f/u appts with other MDs     - NO NSAIDs x 2 weeks POST-OP ORDERS: ENDOSCOPY & COLONOSCOPY:    1. Rest today.    2. DO NOT eat or drink until wide awake; eat your usual diet today in moderate amount only.    3. DO NOT drive today.    4. Call physician if you have severe pain, vomiting, fever, rectal bleeding or black bowel movements.    5.  If a biopsy was taken or a polyp removed, you should expect to hear results in about 7-10 days.  If you have heard nothing from your physician by then, call the office for results.    6.  Discharge home when patient awake, vitals signs stable and tolerating liquids.    7. Call with questions or concerns [phone].

## 2024-04-16 NOTE — ANESTHESIA PRE PROCEDURE
162/86       NPO Status: Time of last liquid consumption: 2000 (drank prep at 11am and  second 4 pm)                        Time of last solid consumption: 1400                        Date of last liquid consumption: 04/15/24                        Date of last solid food consumption: 04/14/24    BMI:   Wt Readings from Last 3 Encounters:   10/04/23 79.4 kg (175 lb)   09/28/23 80.3 kg (177 lb)   07/27/22 77.6 kg (171 lb)     There is no height or weight on file to calculate BMI.    CBC:   Lab Results   Component Value Date/Time     04/05/2017 08:14 AM       CMP:   Lab Results   Component Value Date/Time     06/06/2018 12:44 PM    K 3.3 06/06/2018 12:44 PM     06/06/2018 12:44 PM    CO2 26 06/06/2018 12:44 PM    BUN 12 06/06/2018 12:44 PM    CREATININE 0.5 06/06/2018 12:44 PM    LABGLOM >60 06/06/2018 12:44 PM    GLUCOSE 87 06/06/2018 12:44 PM    CALCIUM 8.7 06/06/2018 12:44 PM       POC Tests: No results for input(s): \"POCGLU\", \"POCNA\", \"POCK\", \"POCCL\", \"POCBUN\", \"POCHEMO\", \"POCHCT\" in the last 72 hours.    Coags:   Lab Results   Component Value Date/Time    PROTIME 12.6 04/05/2017 08:14 AM    INR 0.94 04/05/2017 08:14 AM    APTT 27.2 04/05/2017 08:14 AM       HCG (If Applicable): No results found for: \"PREGTESTUR\", \"PREGSERUM\", \"HCG\", \"HCGQUANT\"     ABGs: No results found for: \"PHART\", \"PO2ART\", \"SEE6OEU\", \"KKK3LXV\", \"BEART\", \"U4VOCTYE\"     Type & Screen (If Applicable):  No results found for: \"LABABO\", \"LABRH\"    Drug/Infectious Status (If Applicable):  No results found for: \"HIV\", \"HEPCAB\"    COVID-19 Screening (If Applicable): No results found for: \"COVID19\"        Anesthesia Evaluation  Patient summary reviewed  Airway: Mallampati: II  TM distance: >3 FB   Neck ROM: full  Mouth opening: < 3 FB   Dental:    (+) upper dentures      Pulmonary: breath sounds clear to auscultation  (+)  COPD:          asthma:                           ROS comment: Former smoker- quit 1995

## 2024-04-16 NOTE — H&P
Provider, MD Katy   Cyanocobalamin (B-12) 2500 MCG TABS Take by mouth    Katy Smalls MD   donepezil (ARICEPT) 10 MG tablet Take 1 tablet by mouth daily    Katy Smalls MD   atenolol (TENORMIN) 50 MG tablet Take 0.5 tablets by mouth daily    Katy Smalls MD   clonazePAM (KLONOPIN) 2 MG tablet Take 1 tablet by mouth nightly.    Katy Smalls MD   levothyroxine (SYNTHROID) 75 MCG tablet Take 1 tablet by mouth Daily    Katy Smalls MD   memantine (NAMENDA) 10 MG tablet Take 1 tablet by mouth daily    Katy Smalls MD   omeprazole (PRILOSEC) 40 MG delayed release capsule Take 1 capsule by mouth daily    Katy Smalls MD   rOPINIRole (REQUIP) 5 MG tablet Take 1 tablet by mouth nightly    Katy Smalls MD   sertraline (ZOLOFT) 50 MG tablet Take 1 tablet by mouth daily    Katy Smalls MD   simvastatin (ZOCOR) 40 MG tablet Take 1 tablet by mouth every morning    Katy Smalls MD   aspirin 81 MG tablet Take 1 tablet by mouth daily  Patient not taking: Reported on 4/16/2024    Katy Smalls MD       Past Medical History:  Past Medical History:   Diagnosis Date    Arthritis     Asthma     Breast adenoma     COPD (chronic obstructive pulmonary disease) (HCC)     Edema     Hyperlipidemia     Hypertension     Hypothyroidism        Past Surgical History:  Past Surgical History:   Procedure Laterality Date    BACK SURGERY      BREAST BIOPSY      CHOLECYSTECTOMY      COLONOSCOPY  04/13/2023    Dr Jeffrey-  [facility]    COLONOSCOPY N/A 10/04/2023    Dr Deal, redundant tortuous colon, TA x 3 (-) dysplasia, Evid previous polypectomy w 3 hemo clips & Krupa ink tattooing in hepatic flexure wo obv residual or recurrent lesion, 2 sm erosions underneath hemp clips wo bleeding, Mod diverticulosis left colon, int hem Gr 1-2, sm perianal tabs wo bleeding-likely source of rectal bleeding, 6 months    HYSTERECTOMY (CERVIX STATUS UNKNOWN)       STAPEDES SURGERY         Social History:  Social History     Tobacco Use    Smoking status: Former     Current packs/day: 0.00     Types: Cigarettes     Quit date: 4/15/1995     Years since quittin.0    Smokeless tobacco: Never   Vaping Use    Vaping Use: Never used   Substance Use Topics    Alcohol use: No    Drug use: Never       Vital Signs:   Vitals:    24 0748   BP: (!) 175/84   Pulse: 71   Resp: 20   Temp: 97.2 °F (36.2 °C)   SpO2: 91%        Physical Exam:  Cardiac:  [x]WNL  []Comments:  Pulmonary:  [x]WNL   []Comments:  Neuro/Mental Status:  [x]WNL  []Comments:  Abdominal:  [x]WNL    []Comments:  Other:   []WNL  []Comments:    Informed Consent:  The risks and benefits of the procedure have been discussed with either the patient or if they cannot consent, their representative.    Assessment:  Patient examined and appropriate for planned sedation and procedure.     Plan:  Proceed with planned sedation and procedure as above.         Ash Deal MD

## 2024-04-16 NOTE — OP NOTE
Patient: Suad Apple : 1940  Med Rec#: [id number] Acc#: [id number]   Primary Care Provider Anette Lang    Date of Procedure:  2024    Endoscopist: Ash Deal MD, MD    Referring Provider: Anette Lang,     Operation Performed: Colonoscopy up to the cecum with hot snare resection of recurrent polyp at the site of previous polypectomy in the proximal transverse colon near the hepatic flexure    Indications: History of multiple adenomatous colon colon polyps on her previous colonoscopy exams including last year-needs colon cancer surveillance    Anesthesia:  Sedation was administered by anesthesia who monitored the patient during the procedure.    I met with Suad Apple prior to procedure. We discussed the procedure itself, and I have discussed the risks of endoscopy (including-- but not limited to-- pain, discomfort, bleeding potentially requiring second endoscopic procedure and/or blood transfusion, organ perforation requiring operative repair, damage to organs near the colon, infection, aspiration, cardiopulmonary/allergic reaction), benefits, indications to endoscopy. Additionally, we discussed options other than colonoscopy. The patient expressed understanding. All questions answered. The patient decided to proceed with the procedure.  Signed informed consent was placed on the chart.    Blood Loss: minimal    Withdrawal time: More than 9 minutes  Bowel Prep: Fair  with small amounts of thick solid and semisolid stool and a moderate amount of thick, opaque liquid scattered in patchy segments throughout the colon obscuring the underlying mucosa.Lesions including polyps may have been missed.     Complications: no immediate complications    DESCRIPTION OF PROCEDURE:     A time out was performed. After written informed consent was obtained, the patient was placed in the left lateral position.     The perianal area was inspected, and a digital rectal exam was performed. A

## 2024-04-16 NOTE — ANESTHESIA POSTPROCEDURE EVALUATION
Department of Anesthesiology  Postprocedure Note    Patient: Suad Apple  MRN: [id number]  YOB: 1940  Date of evaluation: 4/16/2024    Procedure Summary       Date: 04/16/24 Room / Location: [place] [address] / [facility] [place]    Anesthesia Start: 0933 Anesthesia Stop: 0958    Procedure: COLONOSCOPY POLYPECTOMY SNARE BIOPSY (Abdomen) Diagnosis:       Hx of colonic polyps      Screen for colon cancer      (Hx of colonic polyps [Z86.010])      (Screen for colon cancer [Z12.11])    Surgeons: Ash Deal MD Responsible Provider: Bhumi Friedman APRN - CRNA    Anesthesia Type: general, TIVA ASA Status: 3            Anesthesia Type: No value filed.    Marguerite Phase I:      Marguerite Phase II:      Anesthesia Post Evaluation    Patient location during evaluation: bedside  Patient participation: complete - patient participated  Level of consciousness: sleepy but conscious  Pain score: 0  Airway patency: patent  Nausea & Vomiting: no nausea and no vomiting  Cardiovascular status: hemodynamically stable and blood pressure returned to baseline  Respiratory status: acceptable and nasal cannula  Hydration status: stable  Pain management: adequate    No notable events documented.

## 2025-04-16 ENCOUNTER — OFFICE VISIT (OUTPATIENT)
Dept: NEUROLOGY | Age: 85
End: 2025-04-16
Payer: MEDICARE

## 2025-04-16 VITALS
WEIGHT: 170 LBS | SYSTOLIC BLOOD PRESSURE: 191 MMHG | OXYGEN SATURATION: 95 % | HEIGHT: 61 IN | BODY MASS INDEX: 32.1 KG/M2 | HEART RATE: 58 BPM | DIASTOLIC BLOOD PRESSURE: 78 MMHG

## 2025-04-16 DIAGNOSIS — E55.9 VITAMIN D DEFICIENCY: ICD-10-CM

## 2025-04-16 DIAGNOSIS — R41.3 MEMORY LOSS: ICD-10-CM

## 2025-04-16 DIAGNOSIS — R53.83 OTHER FATIGUE: ICD-10-CM

## 2025-04-16 DIAGNOSIS — R25.1 TREMOR: ICD-10-CM

## 2025-04-16 DIAGNOSIS — M79.604 PAIN OF RIGHT LOWER EXTREMITY: ICD-10-CM

## 2025-04-16 DIAGNOSIS — R29.6 FALLS: ICD-10-CM

## 2025-04-16 DIAGNOSIS — R25.1 TREMOR: Primary | ICD-10-CM

## 2025-04-16 LAB
25(OH)D3 SERPL-MCNC: 39.7 NG/ML
ALBUMIN SERPL-MCNC: 4.5 G/DL (ref 3.5–5.2)
ALP SERPL-CCNC: 114 U/L (ref 35–104)
ALT SERPL-CCNC: 9 U/L (ref 10–35)
ANION GAP SERPL CALCULATED.3IONS-SCNC: 12 MMOL/L (ref 8–16)
AST SERPL-CCNC: 53 U/L (ref 10–35)
BASOPHILS # BLD: 0.1 K/UL (ref 0–0.2)
BASOPHILS NFR BLD: 0.9 % (ref 0–1)
BILIRUB SERPL-MCNC: 0.7 MG/DL (ref 0.2–1.2)
BUN SERPL-MCNC: 11 MG/DL (ref 8–23)
CALCIUM SERPL-MCNC: 9.9 MG/DL (ref 8.8–10.2)
CHLORIDE SERPL-SCNC: 103 MMOL/L (ref 98–107)
CO2 SERPL-SCNC: 27 MMOL/L (ref 22–29)
CREAT SERPL-MCNC: 0.7 MG/DL (ref 0.5–0.9)
CRP SERPL-MCNC: <3 MG/L (ref 0–5)
EOSINOPHIL # BLD: 0.3 K/UL (ref 0–0.6)
EOSINOPHIL NFR BLD: 2.7 % (ref 0–5)
ERYTHROCYTE [DISTWIDTH] IN BLOOD BY AUTOMATED COUNT: 13.6 % (ref 11.5–14.5)
ERYTHROCYTE [SEDIMENTATION RATE] IN BLOOD BY WESTERGREN METHOD: 19 MM/HR (ref 0–25)
GLUCOSE SERPL-MCNC: 104 MG/DL (ref 70–99)
HCT VFR BLD AUTO: 45.3 % (ref 37–47)
HGB BLD-MCNC: 14.7 G/DL (ref 12–16)
HIV-1 P24 AG: NORMAL
HIV1+2 AB SERPLBLD QL IA.RAPID: NORMAL
IMM GRANULOCYTES # BLD: 0 K/UL
LYMPHOCYTES # BLD: 4.7 K/UL (ref 1.1–4.5)
LYMPHOCYTES NFR BLD: 41.5 % (ref 20–40)
Lab: NORMAL
MCH RBC QN AUTO: 31.6 PG (ref 27–31)
MCHC RBC AUTO-ENTMCNC: 32.5 G/DL (ref 33–37)
MCV RBC AUTO: 97.4 FL (ref 81–99)
MONOCYTES # BLD: 0.6 K/UL (ref 0–0.9)
MONOCYTES NFR BLD: 5.5 % (ref 0–10)
NEUTROPHILS # BLD: 5.6 K/UL (ref 1.5–7.5)
NEUTS SEG NFR BLD: 49.2 % (ref 50–65)
PLATELET # BLD AUTO: 237 K/UL (ref 130–400)
PMV BLD AUTO: 10.8 FL (ref 9.4–12.3)
POTASSIUM SERPL-SCNC: 3.7 MMOL/L (ref 3.5–5.1)
PROT SERPL-MCNC: 7.5 G/DL (ref 6.4–8.3)
RBC # BLD AUTO: 4.65 M/UL (ref 4.2–5.4)
REPORT: NORMAL
SODIUM SERPL-SCNC: 142 MMOL/L (ref 136–145)
THIS TEST SENT TO: NORMAL
VIT B12 SERPL-MCNC: >4000 PG/ML (ref 232–1245)
WBC # BLD AUTO: 11.3 K/UL (ref 4.8–10.8)

## 2025-04-16 PROCEDURE — G8400 PT W/DXA NO RESULTS DOC: HCPCS | Performed by: NURSE PRACTITIONER

## 2025-04-16 PROCEDURE — 1090F PRES/ABSN URINE INCON ASSESS: CPT | Performed by: NURSE PRACTITIONER

## 2025-04-16 PROCEDURE — G8427 DOCREV CUR MEDS BY ELIG CLIN: HCPCS | Performed by: NURSE PRACTITIONER

## 2025-04-16 PROCEDURE — 1160F RVW MEDS BY RX/DR IN RCRD: CPT | Performed by: NURSE PRACTITIONER

## 2025-04-16 PROCEDURE — 99214 OFFICE O/P EST MOD 30 MIN: CPT | Performed by: NURSE PRACTITIONER

## 2025-04-16 PROCEDURE — G8417 CALC BMI ABV UP PARAM F/U: HCPCS | Performed by: NURSE PRACTITIONER

## 2025-04-16 PROCEDURE — 1036F TOBACCO NON-USER: CPT | Performed by: NURSE PRACTITIONER

## 2025-04-16 PROCEDURE — 1159F MED LIST DOCD IN RCRD: CPT | Performed by: NURSE PRACTITIONER

## 2025-04-16 PROCEDURE — 1123F ACP DISCUSS/DSCN MKR DOCD: CPT | Performed by: NURSE PRACTITIONER

## 2025-04-16 NOTE — PROGRESS NOTES
[facility] Office Note      Patient:   Suda Apple  MR#:    [id number]  Account Number:                         YOB: 1940  Date of Evaluation:  4/16/2025  Time of Note:                          1:17 PM  Primary/Referring Physician:  Anette Lang APRN - CNP   Consulting Physician:  ALBA Manuel    NEW PATIENT CONSULTATION      Chief Complaint   Patient presents with    New Patient    Memory Loss     C/O memory loss that started ~2 years ago.     Balance issues     C/O issues with balance that has caused falls and right leg pain.        History of Present Illness  Suad Apple is a 85 y.o. year old female here for evaluation and treatment of memory concerns, balance issues, falling, tremor, and right leg issues.    Persistent aching in the right leg has been experienced for the past three weeks, with no recollection of any specific injury or trauma. The pain radiates from the knee down to the foot. An ultrasound was performed to rule out a potential blood clot, but the results were negative.  There is swelling to the medial calf.  No bruising or discoloration    A gradual decline in both short-term and long-term memory has been reported over the past two years. Despite this, household chores, including cooking, are maintained, and financial responsibilities are shared with her . There is no difficulty in finding the right words or repeating herself. An instance is recalled where she was unable to locate the clinic for her ear appointment at [facility]. She resides with her  and maintains a good appetite. Driving continues without any incidents of getting lost or involved in accidents. A pill organizer is used to manage medications. No family history of memory loss or personal history of stroke is reported. Brain-stimulating activities such as playing Solitaire are engaged in.    Tremors have been experienced for the past two to three years, which have remained

## 2025-04-17 ENCOUNTER — RESULTS FOLLOW-UP (OUTPATIENT)
Dept: NEUROLOGY | Age: 85
End: 2025-04-17

## 2025-04-17 DIAGNOSIS — R76.8 POSITIVE LYME DISEASE SEROLOGY: Primary | ICD-10-CM

## 2025-04-17 LAB
B BURGDOR IGG SER QL IB: NEGATIVE
B BURGDOR IGM SER QL IB: POSITIVE
RPR SER QL: NORMAL

## 2025-04-17 RX ORDER — DOXYCYCLINE HYCLATE 100 MG
100 TABLET ORAL 2 TIMES DAILY
Qty: 28 TABLET | Refills: 0 | Status: SHIPPED | OUTPATIENT
Start: 2025-04-17 | End: 2025-05-01

## 2025-04-18 LAB
A-TOCOPHEROL VIT E SERPL-MCNC: 10.6 MG/L (ref 5.5–18)
ANA PATTERN: ABNORMAL
ANA TITER: ABNORMAL
ANTINUCLEAR AB INTERPRETIVE COMMENT: ABNORMAL
ARSENIC BLD-MCNC: <10 UG/L
BETA+GAMMA TOCOPHEROL SERPL-MCNC: 2.2 MG/L (ref 0–6)
CADMIUM BLD-MCNC: <1 UG/L
CERULOPLASMIN SERPL-MCNC: 30 MG/DL (ref 16–45)
COPPER SERPL-MCNC: 116.6 UG/DL (ref 80–155)
LEAD BLD-MCNC: <2 UG/DL
MERCURY BLD-MCNC: <2.5 UG/L
NUCLEAR IGG SER QL IA: DETECTED
NUCLEAR IGG SER QL IF: DETECTED

## 2025-04-18 NOTE — TELEPHONE ENCOUNTER
Called patient to go over lab results.   Per provider,   So far labs nonconcerning, can discontinue B12 supplement.   Also went over,   ----- Message from ALBA Manuel CNP sent at 4/17/2025  8:43 PM CDT -----  Please let her know she is positive for Lyme IgM which means she has Lyme disease infection that requires treatment.  I am sending in doxycycline, please take complete prescription.  Side effects include nausea, diarrhea.    Pt voiced understanding is aware to pick the medication up from the pharmacy.

## 2025-04-18 NOTE — TELEPHONE ENCOUNTER
----- Message from ALBA Manuel CNP sent at 4/17/2025  8:43 PM CDT -----  Please let her know she is positive for Lyme IgM which means she has Lyme disease infection that requires treatment.  I am sending in doxycycline, please take complete prescription.  Side effects include nausea, diarrhea.

## 2025-04-19 LAB
ENA RNP IGG SER IA-ACNC: 4 UNITS (ref 0–19)
VIT B1 BLD-MCNC: 203 NMOL/L (ref 70–180)

## 2025-04-20 LAB
DSDNA AB TITR SER CLIF: 8 IU (ref 0–24)
ENA JO1 AB TITR SER: 2 AU/ML (ref 0–40)
ENA SCL70 IGG SER QL: 0 AU/ML (ref 0–40)
ENA SM IGG SER-ACNC: 2 AU/ML (ref 0–40)
ENA SS-A 60KD AB SER-ACNC: 0 AU/ML (ref 0–40)
ENA SS-A IGG SER IA-ACNC: 7 AU/ML (ref 0–40)
ENA SS-B IGG SER IA-ACNC: 0 AU/ML (ref 0–40)

## 2025-04-22 LAB
METHYLMALONATE SERPL-SCNC: <0.2 UMOL/L (ref 0–0.4)
MISCELLANEOUS LAB TEST ORDER: NORMAL

## 2025-05-08 ENCOUNTER — HOSPITAL ENCOUNTER (OUTPATIENT)
Dept: CT IMAGING | Age: 85
Discharge: HOME | End: 2025-05-08
Payer: MEDICARE

## 2025-05-08 ENCOUNTER — TELEPHONE (OUTPATIENT)
Dept: NEUROLOGY | Age: 85
End: 2025-05-08

## 2025-05-08 DIAGNOSIS — M79.604 PAIN OF RIGHT LOWER EXTREMITY: ICD-10-CM

## 2025-05-08 DIAGNOSIS — R41.3 MEMORY LOSS: ICD-10-CM

## 2025-05-08 DIAGNOSIS — R25.1 TREMOR: ICD-10-CM

## 2025-05-08 DIAGNOSIS — R29.6 FALLS: ICD-10-CM

## 2025-05-08 PROCEDURE — 6360000004 HC RX CONTRAST MEDICATION: Performed by: NURSE PRACTITIONER

## 2025-05-08 PROCEDURE — 70470 CT HEAD/BRAIN W/O & W/DYE: CPT

## 2025-05-08 PROCEDURE — 73701 CT LOWER EXTREMITY W/DYE: CPT

## 2025-05-08 RX ORDER — IOPAMIDOL 755 MG/ML
100 INJECTION, SOLUTION INTRAVASCULAR
Status: COMPLETED | OUTPATIENT
Start: 2025-05-08 | End: 2025-05-08

## 2025-05-08 RX ADMIN — IOPAMIDOL 100 ML: 755 INJECTION, SOLUTION INTRAVENOUS at 13:29

## 2025-05-08 NOTE — TELEPHONE ENCOUNTER
Veronica called with Pre Cert department to let me know that CT Head was denied with patient insurance, she stated that she had called the office and spoke with Johanne and  that she was to have faxed additional information, and it was never completed. She ask that I cancel the appointment and notify patient.       I was able to go on line with Amita and have the CT Head Approved  Auth# [id number]  Valid 5/8/25-7/7/25    I placed authorization number in appointment note for patient appointment today.

## 2025-05-15 DIAGNOSIS — M79.604 RIGHT LEG PAIN: Primary | ICD-10-CM

## 2025-06-09 ENCOUNTER — OFFICE VISIT (OUTPATIENT)
Age: 85
End: 2025-06-09
Payer: MEDICARE

## 2025-06-09 VITALS — BODY MASS INDEX: 32.1 KG/M2 | WEIGHT: 170 LBS | HEIGHT: 61 IN

## 2025-06-09 DIAGNOSIS — M17.11 PRIMARY OSTEOARTHRITIS OF RIGHT KNEE: Primary | ICD-10-CM

## 2025-06-09 DIAGNOSIS — R22.41 LOCALIZED SWELLING OF RIGHT LOWER LEG: ICD-10-CM

## 2025-06-09 PROCEDURE — G8427 DOCREV CUR MEDS BY ELIG CLIN: HCPCS | Performed by: ORTHOPAEDIC SURGERY

## 2025-06-09 PROCEDURE — G8400 PT W/DXA NO RESULTS DOC: HCPCS | Performed by: ORTHOPAEDIC SURGERY

## 2025-06-09 PROCEDURE — 1036F TOBACCO NON-USER: CPT | Performed by: ORTHOPAEDIC SURGERY

## 2025-06-09 PROCEDURE — G8417 CALC BMI ABV UP PARAM F/U: HCPCS | Performed by: ORTHOPAEDIC SURGERY

## 2025-06-09 PROCEDURE — 99204 OFFICE O/P NEW MOD 45 MIN: CPT | Performed by: ORTHOPAEDIC SURGERY

## 2025-06-09 PROCEDURE — 1123F ACP DISCUSS/DSCN MKR DOCD: CPT | Performed by: ORTHOPAEDIC SURGERY

## 2025-06-09 PROCEDURE — 1090F PRES/ABSN URINE INCON ASSESS: CPT | Performed by: ORTHOPAEDIC SURGERY

## 2025-06-09 PROCEDURE — 1159F MED LIST DOCD IN RCRD: CPT | Performed by: ORTHOPAEDIC SURGERY

## 2025-06-09 RX ORDER — METHYLPREDNISOLONE 4 MG/1
TABLET ORAL
Qty: 1 KIT | Refills: 0 | Status: SHIPPED | OUTPATIENT
Start: 2025-06-09

## 2025-06-09 NOTE — PROGRESS NOTES
negative   Chest pain.   GI negative   Abdominal pain and Vomiting.    negative   Urinary incontinence.   Neuro negative   Headache.   Psych negative   Psychiatric symptoms.       Physical Exam  Lower extremity examination was performed.  Patient has some tenderness to palpation over the medial compartment medial thigh and medial calf.  There is no swelling or signs of cellulitis.       Ht 1.549 m (5' 1\")   Wt 77.1 kg (170 lb)   BMI 32.12 kg/m²     GENERAL: No distress.  ORIENTATION: Alert and oriented to time, place, person.  MOOD AND AFFECT: Cooperative and pleasant.    Radiology:   No results found.     MRI Result (most recent):  No results found for this or any previous visit from the past 3650 days.       Results  Imaging  CT scan of the leg shows subcutaneous edema without a deep soft tissue ulcer or drainable fluid collection, no deep soft tissue gas, and significant arthritis in the knee.  Ultrasound of the leg was negative for a blood clot.       Assessment & Plan  1. Leg pain.  With subcutaneous edema found on CT scan as well as medial compartment osteoarthritis.  The patient's leg pain is likely due to severe osteoarthritis in the knee, as indicated by imaging studies. The presence of subcutaneous edema is noted, but it is unclear if it is directly related to the arthritis. The possibility of a rheumatologic issue was considered but deemed unlikely due to the absence of other symptoms. A steroid pack will be prescribed to manage the inflammation and swelling. The prescription will be sent to [facility] in [place]. If there is no improvement following the steroid pack treatment, a cortisone injection into the knee may be considered.    Follow-up  The patient will follow up in 3 weeks.       Encounter Diagnoses   Name Primary?    Primary osteoarthritis of right knee Yes    Localized swelling of right lower leg               Return in about 1 week (around 6/16/2025).     No orders of the defined types

## (undated) DEVICE — ENDO KIT,LOURDES HOSPITAL: Brand: MEDLINE INDUSTRIES, INC.

## (undated) DEVICE — YANKAUER,BULB TIP WITH VENT: Brand: ARGYLE

## (undated) DEVICE — MASK,OXYGEN,MED CONC,ADLT,7' TUB, UC: Brand: PENDING

## (undated) DEVICE — TRAP,MUCUS SPECIMEN,40CC: Brand: MEDLINE

## (undated) DEVICE — SINGLE-USE POLYPECTOMY SNARE: Brand: CAPTIVATOR II

## (undated) DEVICE — CANNULA NSL AD L7FT DIV O2 CO2 W/ M LUERLOCK TRMPT CONN

## (undated) DEVICE — SNARE POLYP SM W13MMXL240CM SHTH DIA2.4MM OVL FLX DISP

## (undated) DEVICE — SPONGE ENDOSCP CLN BS INF PREVENTION KOALA

## (undated) DEVICE — ADAPTER CLEANING PORPOISE CLEANING

## (undated) DEVICE — SINGLE PORT MANIFOLD: Brand: NEPTUNE 2

## (undated) DEVICE — CUFF,BP,DISP,1 TUBE,ADULT,HP: Brand: MEDLINE

## (undated) DEVICE — PATIENT RETURN ELECTRODE, SINGLE-USE, CONTACT QUALITY MONITORING, ADULT, WITH 9FT CORD, FOR PATIENTS WEIGING OVER 33LBS. (15KG): Brand: MEGADYNE

## (undated) DEVICE — THE SINGLE USE ETRAP – POLYP TRAP IS USED FOR SUCTION RETRIEVAL OF ENDOSCOPICALLY REMOVED POLYPS.: Brand: ETRAP

## (undated) DEVICE — CLEANING SPONGE: Brand: KOALA™

## (undated) DEVICE — THE CHANNEL CLEANING BRUSH IS A NYLON FLEXI BRUSH ATTACHED TO A FLEXIBLE PLASTIC SHEATH DESIGNED TO SAFELY REMOVE DEBRIS FROM FLEXIBLE ENDOSCOPES.

## (undated) DEVICE — Device: Brand: BLACK EYE

## (undated) DEVICE — BRUSH ENDOSCP 2 END CHN HEDGEHOG

## (undated) DEVICE — SNAR POLYP SENSATION MICRO OVL 13 240X40

## (undated) DEVICE — SENSR O2 OXIMAX FNGR A/ 18IN NONSTR

## (undated) DEVICE — SNAR POLYP CAPTIVATOR RND STFF 2.4 240CM 10MM 1P/U

## (undated) DEVICE — Device: Brand: DEFENDO AIR/WATER/SUCTION AND BIOPSY VALVE

## (undated) DEVICE — NDL SCLEROTHRPY INTERJECT 25G 4 240 CLR